# Patient Record
Sex: FEMALE | Race: WHITE | NOT HISPANIC OR LATINO
[De-identification: names, ages, dates, MRNs, and addresses within clinical notes are randomized per-mention and may not be internally consistent; named-entity substitution may affect disease eponyms.]

---

## 2018-09-02 ENCOUNTER — HEALTH MAINTENANCE LETTER (OUTPATIENT)
Age: 28
End: 2018-09-02

## 2019-02-18 ENCOUNTER — OFFICE VISIT (OUTPATIENT)
Dept: DERMATOLOGY | Facility: CLINIC | Age: 29
End: 2019-02-18
Payer: COMMERCIAL

## 2019-02-18 VITALS — SYSTOLIC BLOOD PRESSURE: 107 MMHG | RESPIRATION RATE: 16 BRPM | DIASTOLIC BLOOD PRESSURE: 72 MMHG | HEART RATE: 89 BPM

## 2019-02-18 DIAGNOSIS — L81.4 LENTIGO: ICD-10-CM

## 2019-02-18 DIAGNOSIS — D22.9 NEVUS: Primary | ICD-10-CM

## 2019-02-18 DIAGNOSIS — L82.1 SEBORRHEIC KERATOSIS: ICD-10-CM

## 2019-02-18 DIAGNOSIS — D48.5 NEOPLASM OF UNCERTAIN BEHAVIOR OF SKIN: ICD-10-CM

## 2019-02-18 DIAGNOSIS — D18.00 ANGIOMA: ICD-10-CM

## 2019-02-18 PROCEDURE — 11102 TANGNTL BX SKIN SINGLE LES: CPT | Performed by: PHYSICIAN ASSISTANT

## 2019-02-18 PROCEDURE — 99214 OFFICE O/P EST MOD 30 MIN: CPT | Mod: 25 | Performed by: PHYSICIAN ASSISTANT

## 2019-02-18 PROCEDURE — 88305 TISSUE EXAM BY PATHOLOGIST: CPT | Mod: TC | Performed by: PHYSICIAN ASSISTANT

## 2019-02-18 RX ORDER — ACETAMINOPHEN 325 MG/1
325-650 TABLET ORAL
COMMUNITY
End: 2022-04-18

## 2019-02-18 RX ORDER — IBUPROFEN 200 MG
200-600 TABLET ORAL
COMMUNITY
Start: 2019-02-05 | End: 2022-04-18

## 2019-02-18 RX ORDER — CHOLECALCIFEROL (VITAMIN D3) 50 MCG
TABLET ORAL
COMMUNITY
Start: 2018-02-17

## 2019-02-18 RX ORDER — BREAST PUMP
EACH MISCELLANEOUS
COMMUNITY
Start: 2019-02-05 | End: 2022-04-18

## 2019-02-18 RX ORDER — DOCUSATE SODIUM 100 MG/1
100 CAPSULE, LIQUID FILLED ORAL
COMMUNITY
End: 2022-04-18

## 2019-02-18 NOTE — LETTER
2/18/2019         RE: Mary Martinez  [id number] [facility] [id number]        Dear Colleague,    Thank you for referring your patient, Mary Martinez, to the [facility]. Please see a copy of my visit note below.    HPI:   Mary Martinez is a 28 year old female who presents for Full skin cancer screening.  chief complaint  Last Skin Exam: n/a      1st Baseline: YES  Personal HX of Skin Cancer: no   Personal HX of Malignant Melanoma: no   Family HX of Skin Cancer / Malignant Melanoma: no  Personal HX of Atypical Moles:   no  Risk factors: sun exposure with hx of tanning bed use for 1.5 years during high school. Using sun screen when out for long periods of time.   New / Changing lesions: irregular mole on right thigh (changing in shape). New mole on abdomen that is dark.  Social History: has 3 year old and 2 week old boys.  Going to the [facility] in april  On review of systems, there are no further skin complaints, patient is feeling otherwise well.  See patient intake sheet.  ROS of the following were done and are negative: Constitutional, Eyes, Ears, Nose,   Mouth, Throat, Cardiovascular, Respiratory, GI, Genitourinary, Musculoskeletal,   Psychiatric, Endocrine, Allergic/Immunologic.    This document serves as a record of the services and decisions personally performed and made by Neda Campbell PA-C. It was created on her behalf by Chikis Cruz, a trained medical scribe. The creation of this document is based the provider's statements to the medical scribe.  Chikis Cruz February 18, 2019 2:24 PM    PHYSICAL EXAM:   /72 (BP Location: Left arm, Patient Position: Chair, Cuff Size: Adult Regular)   Pulse 89   Resp 16   Skin exam performed as follows: Type 2 skin. Mood appropriate  Alert and Oriented X 3. Well developed, well nourished in no distress.  General appearance: Normal  Head including face: Normal  Eyes: conjunctiva and lids: Normal  Mouth: Lips, teeth, gums: Normal  Neck:  Normal  Chest-breast/axillae: Normal  Back: Normal  Spleen and liver: Normal  Cardiovascular: Exam of peripheral vascular system by observation for swelling, varicosities, edema: Normal  Genitalia: groin, buttocks: Normal  Extremities: digits/nails (clubbing): Normal  Eccrine and Apocrine glands: Normal  Right upper extremity: Normal  Left upper extremity: Normal  Right lower extremity: Normal  Left lower extremity: Normal  Skin: Scalp and body hair: See below    Pt deferred exam of breasts, groin, buttocks: No    Other physical findings:  1. Multiple pigmented macules on extremities and trunk  2. Multiple pigmented macules on face, trunk and extremities  3. Multiple vascular papules on trunk, arms and legs  4. Multiple scattered keratotic plaques  5. 2 mm black macule on mid-lower abdomen     Except as noted above, no other signs of skin cancer or melanoma.     ASSESSMENT/PLAN:   Benign Full skin cancer screening today. Patient with no history of skin cancer  Advised on monthly self exams and 1 year  Patient Education: Appropriate brochures given.    Multiple benign appearing nevi on arms, legs and trunk. Discussed ABCDEs of melanoma and sunscreen.   Multiple lentigos on arms, legs and trunk. Advised benign, no treatment needed.  Multiple scattered angiomas. Advised benign, no treatment needed.   Seborrheic keratosis on arms, legs and trunk. Advised benign, no treatment needed.  Atypical nevus on the mid lower abdomen - advised A photo was taken and placed in the chart. Shave bx in typical fashion .  Area cleaned with betadyne and anesthetized with 1% lidocaine with epi .  Dermablade used to remove the lesion and sent to pathology. Bleeding was cauterized. Pt tolerated procedure well.       Follow-up: pending path/yearly FSE/PRN sooner    1.) Patient was asked about new and changing moles. YES  2.) Patient received a complete physical skin examination: YES  3.) Patient was counseled to perform a monthly self skin  examination: YES  Scribed By: Chikis Cruz, Medical Scribe    The information in this document, created by the medical scribe for me, accurately reflects the services I personally performed and the decisions made by me. I have reviewed and approved this document for accuracy prior to leaving the patient care area.  Neda Campbell PA-C February 18, 2019 2:28 PM     Again, thank you for allowing me to participate in the care of your patient.        Sincerely,        Neda Campbell PA-C

## 2019-02-18 NOTE — PROGRESS NOTES
HPI:   Mary Martinez is a 28 year old female who presents for Full skin cancer screening.  chief complaint  Last Skin Exam: n/a      1st Baseline: YES  Personal HX of Skin Cancer: no   Personal HX of Malignant Melanoma: no   Family HX of Skin Cancer / Malignant Melanoma: no  Personal HX of Atypical Moles:   no  Risk factors: sun exposure with hx of tanning bed use for 1.5 years during high school. Using sun screen when out for long periods of time.   New / Changing lesions: irregular mole on right thigh (changing in shape). New mole on abdomen that is dark.  Social History: has 3 year old and 2 week old boys.  Going to the [employer] in april  On review of systems, there are no further skin complaints, patient is feeling otherwise well.  See patient intake sheet.  ROS of the following were done and are negative: Constitutional, Eyes, Ears, Nose,   Mouth, Throat, Cardiovascular, Respiratory, GI, Genitourinary, Musculoskeletal,   Psychiatric, Endocrine, Allergic/Immunologic.    This document serves as a record of the services and decisions personally performed and made by Neda Campbell PA-C. It was created on her behalf by Chikis Cruz, a trained medical scribe. The creation of this document is based the provider's statements to the medical scribe.  Chikis Cruz February 18, 2019 2:24 PM    PHYSICAL EXAM:   /72 (BP Location: Left arm, Patient Position: Chair, Cuff Size: Adult Regular)   Pulse 89   Resp 16   Skin exam performed as follows: Type 2 skin. Mood appropriate  Alert and Oriented X 3. Well developed, well nourished in no distress.  General appearance: Normal  Head including face: Normal  Eyes: conjunctiva and lids: Normal  Mouth: Lips, teeth, gums: Normal  Neck: Normal  Chest-breast/axillae: Normal  Back: Normal  Spleen and liver: Normal  Cardiovascular: Exam of peripheral vascular system by observation for swelling, varicosities, edema: Normal  Genitalia: groin, buttocks: Normal  Extremities:  digits/nails (clubbing): Normal  Eccrine and Apocrine glands: Normal  Right upper extremity: Normal  Left upper extremity: Normal  Right lower extremity: Normal  Left lower extremity: Normal  Skin: Scalp and body hair: See below    Pt deferred exam of breasts, groin, buttocks: No    Other physical findings:  1. Multiple pigmented macules on extremities and trunk  2. Multiple pigmented macules on face, trunk and extremities  3. Multiple vascular papules on trunk, arms and legs  4. Multiple scattered keratotic plaques  5. 2 mm black macule on mid-lower abdomen     Except as noted above, no other signs of skin cancer or melanoma.     ASSESSMENT/PLAN:   Benign Full skin cancer screening today. Patient with no history of skin cancer  Advised on monthly self exams and 1 year  Patient Education: Appropriate brochures given.    Multiple benign appearing nevi on arms, legs and trunk. Discussed ABCDEs of melanoma and sunscreen.   Multiple lentigos on arms, legs and trunk. Advised benign, no treatment needed.  Multiple scattered angiomas. Advised benign, no treatment needed.   Seborrheic keratosis on arms, legs and trunk. Advised benign, no treatment needed.  Atypical nevus on the mid lower abdomen - advised A photo was taken and placed in the chart. Shave bx in typical fashion .  Area cleaned with betadyne and anesthetized with 1% lidocaine with epi .  Dermablade used to remove the lesion and sent to pathology. Bleeding was cauterized. Pt tolerated procedure well.       Follow-up: pending path/yearly FSE/PRN sooner    1.) Patient was asked about new and changing moles. YES  2.) Patient received a complete physical skin examination: YES  3.) Patient was counseled to perform a monthly self skin examination: YES  Scribed By: Chikis Cruz Medical Scribe    The information in this document, created by the medical scribe for me, accurately reflects the services I personally performed and the decisions made by me. I have reviewed  and approved this document for accuracy prior to leaving the patient care area.  Neda Campbell PA-C February 18, 2019 2:28 PM

## 2019-02-18 NOTE — NURSING NOTE
"Chief Complaint   Patient presents with     Derm Problem     irregular mole on leg       Initial /72 (BP Location: Left arm, Patient Position: Chair, Cuff Size: Adult Regular)   Pulse 89   Resp 16  Estimated body mass index is 22.22 kg/m  as calculated from the following:    Height as of 3/24/16: 1.746 m (5' 8.75\").    Weight as of 3/24/16: 67.8 kg (149 lb 6.4 oz).  Medications and allergies reviewed.    Gillian BANUELOS CMA    "

## 2019-02-18 NOTE — PROGRESS NOTES
HPI:   Mary Bond is a 28 year old female who presents for evaluation of mole on her leg.  chief complaint  Location: ***   Condition present for:  ***.   Previous treatments include: ***    Review Of Systems  Eyes: negative  Ears/Nose/Throat: negative  Respiratory: No shortness of breath, dyspnea on exertion, cough, or hemoptysis  Cardiovascular: negative  Gastrointestinal: negative  Genitourinary: negative  Musculoskeletal: negative  Neurologic: negative  Psychiatric: negative    This document serves as a record of the services and decisions personally performed and made by Neda Campbell PA-C. It was created on her behalf by Chikis Cruz, a trained medical scribe. The creation of this document is based the provider's statements to the medical scribe.  Chikis Cruz February 18, 2019 2:15 PM    PHYSICAL EXAM:    There were no vitals taken for this visit.  Skin exam performed as follows: Type 2 skin. Mood appropriate  Alert and Oriented X 3. Well developed, well nourished in no distress.  General appearance: Normal  Head including face: Normal  Eyes: conjunctiva and lids: Normal  Mouth: Lips, teeth, gums: Normal  Neck: Normal  Chest-breast/axillae: Normal  Back: Normal  Spleen and liver: Normal  Cardiovascular: Exam of peripheral vascular system by observation for swelling, varicosities, edema: Normal  Genitalia: groin, buttocks: Normal  Extremities: digits/nails (clubbing): Normal  Eccrine and Apocrine glands: Normal  Right upper extremity: Normal  Left upper extremity: Normal  Right lower extremity: Normal  Left lower extremity: Normal  Skin: Scalp and body hair: See below    1. ***     ASSESSMENT/PLAN:     1. ***        Follow-up: ***  CC:   Scribed By: Chikis Cruz Medical Scribe    ***

## 2019-02-18 NOTE — PATIENT INSTRUCTIONS
Wound Care Instructions     FOR SUPERFICIAL WOUNDS     [facility] [phone]    [facility] [phone]                       AFTER 24 HOURS YOU SHOULD REMOVE THE BANDAGE AND BEGIN DAILY DRESSING CHANGES AS FOLLOWS:     1) Remove Dressing.     2) Clean and dry the area with tap water using a Q-tip or sterile gauze pad.     3) Apply Vaseline, Aquaphor, Polysporin ointment or Bacitracin ointment over entire wound.  Do NOT use Neosporin ointment.     4) Cover the wound with a band-aid, or a sterile non-stick gauze pad and micropore paper tape      REPEAT THESE INSTRUCTIONS AT LEAST ONCE A DAY UNTIL THE WOUND HAS COMPLETELY HEALED.    It is an old wives tale that a wound heals better when it is exposed to air and allowed to dry out. The wound will heal faster with a better cosmetic result if it is kept moist with ointment and covered with a bandage.    **Do not let the wound dry out.**      Supplies Needed:      *Cotton tipped applicators (Q-tips)    *Polysporin Ointment or Bacitracin Ointment (NOT NEOSPORIN)    *Band-aids or non-stick gauze pads and micropore paper tape.      PATIENT INFORMATION:    During the healing process you will notice a number of changes. All wounds develop a small halo of redness surrounding the wound.  This means healing is occurring. Severe itching with extensive redness usually indicates sensitivity to the ointment or bandage tape used to dress the wound.  You should call our office if this develops.      Swelling  and/or discoloration around your surgical site is common, particularly when performed around the eye.    All wounds normally drain.  The larger the wound the more drainage there will be.  After 7-10 days, you will notice the wound beginning to shrink and new skin will begin to grow.  The wound is healed when you can see skin has formed over the entire area.  A healed wound has a healthy, shiny look to the surface and is red to dark pink in color  to normalize.  Wounds may take approximately 4-6 weeks to heal.  Larger wounds may take 6-8 weeks.  After the wound is healed you may discontinue dressing changes.    You may experience a sensation of tightness as your wound heals. This is normal and will gradually subside.    Your healed wound may be sensitive to temperature changes. This sensitivity improves with time, but if you re having a lot of discomfort, try to avoid temperature extremes.    Patients frequently experience itching after their wound appears to have healed because of the continue healing under the skin.  Plain Vaseline will help relieve the itching.        POSSIBLE COMPLICATIONS    BLEEDIN. Leave the bandage in place.  2. Use tightly rolled up gauze or a cloth to apply direct pressure over the bandage for 30  minutes.  3. Reapply pressure for an additional 30 minutes if necessary  4. Use additional gauze and tape to maintain pressure once the bleeding has stopped.

## 2019-02-20 LAB — COPATH REPORT: NORMAL

## 2019-02-26 ENCOUNTER — OFFICE VISIT (OUTPATIENT)
Dept: LAB | Facility: SCHOOL | Age: 29
End: 2019-02-26
Payer: COMMERCIAL

## 2019-02-26 VITALS
WEIGHT: 161 LBS | SYSTOLIC BLOOD PRESSURE: 110 MMHG | BODY MASS INDEX: 23.85 KG/M2 | RESPIRATION RATE: 12 BRPM | HEIGHT: 69 IN | TEMPERATURE: 97.7 F | DIASTOLIC BLOOD PRESSURE: 68 MMHG | HEART RATE: 91 BPM | OXYGEN SATURATION: 99 %

## 2019-02-26 DIAGNOSIS — Z00.00 WELLNESS EXAMINATION: Primary | ICD-10-CM

## 2019-02-26 PROCEDURE — 99213 OFFICE O/P EST LOW 20 MIN: CPT

## 2019-02-26 RX ORDER — PRENATAL VIT/IRON FUM/FOLIC AC 27MG-0.8MG
1 TABLET ORAL DAILY
Refills: 3 | COMMUNITY
Start: 2018-10-05 | End: 2022-04-18

## 2019-02-26 ASSESSMENT — MIFFLIN-ST. JEOR: SCORE: 1523.05

## 2019-02-26 NOTE — PROGRESS NOTES
"SUBJECTIVE:  CC: Mary Martinez is an 28 year old woman who presents for Wellness Check at [facility] [id number].     Review of Healthy Lifestyle:    Do you get at least three servings of calcium containing foods daily (dairy, green leafy vegetables, etc.)? yes     Do you have a high-fiber diet? yes     Amount of exercise or daily activities, outside of work: not now    Do you wear sunscreen on a regular basis? Yes      Are you taking your medications regularly Yes     Have you had an eye exam in the past two years? yes    Do you see a dentist twice per year? no    Do you have sleep apnea, excessive snoring or excessive daytime drowsiness? no    Do you use tobacco in any form? no     OBJECTIVE:    Vitals: /68   Pulse 91   Temp 97.7  F (36.5  C) (Tympanic)   Resp 12   Ht 1.75 m (5' 8.9\")   Wt 73 kg (161 lb)   SpO2 99%   Breastfeeding? Yes   BMI 23.85 kg/m    BMI= Body mass index is 23.85 kg/m .    HEARING: Right Ear:        500Hz:  RESPONSE- on Level:   20 db    1000 Hz: RESPONSE- on Level:   20 db    2000 Hz: RESPONSE- on Level:   20 db    4000 Hz: RESPONSE- on Level:   20 db     Left Ear:       500Hz:  RESPONSE- on Level:   20 db    1000 Hz: RESPONSE- on Level:   20 db    2000 Hz: RESPONSE- on Level:   20 db    4000 Hz: RESPONSE- on Level:   20 db     VISION:  Right eye:  20/20  Left eye:     20/20  Both eyes: 20/20  Corrective lenses?  Yes    Medication Reconciliation: complete    ASSESSMENT/PLAN:  Patient is here today for wellness examination.  Patient was given information on recommendations for health, preventative care, diet and lifestyle changes for her health.  No referrals needed today.    COUNSELING:      reports that  has never smoked. she has never used smokeless tobacco.    Estimated body mass index is 23.85 kg/m  as calculated from the following:    Height as of this encounter: 1.75 m (5' 8.9\").    Weight as of this encounter: 73 kg (161 lb).     Alison WISDOM" DACIA Casillas on 2/26/2019 at 2:24 PM  [facility]

## 2019-02-26 NOTE — PATIENT INSTRUCTIONS
"                 Mary Martinez has completed a Wellness Check at the [facility] on 2/26/2019.      ____________________________________________  [employer] PROVID[employer]                                                                               Wellness Visit Recommendations:      See your regular primary care health provider every year in order to help stay healthy.    Review health changes.     Review your medicines if your doctor has prescribed any.    Talk to your provider about how often to have your cholesterol checked.    If you are at risk for diabetes, you should have a diabetes test (fasting glucose).    Shots: Get a flu shot each year. Get a tetanus shot every 10 years.     Review with your primary care provider other immunizations that you may need based on your age and/or medical/surgical history    Nutrition:     Eat at least 5 servings of fruits and vegetables each day.    Eat whole-grain bread, whole-wheat pasta and brown rice instead of white grains and rice.    Preventive Care to be reviewed by your primary care provider:    Females:        Cervical Cancer Screening                          Breast Cancer Screening                          Colon Cancer Screening  Males:             Prostrate Cancer Screening                          Colon Cancer Screening      Lifestyle:    Exercise at least 150 minutes a week (30 minutes a day, 5 days of the week). This will help you control your weight and help prevent disease or manage disease.    Limit alcohol to one drink per day or less depending on your past medical history.    No smoking.     Wear sunscreen to prevent skin cancer.    See your dentist every six months for an exam and cleaning.    Today's Vital Signs:  /68   Pulse 91   Temp 97.7  F (36.5  C) (Tympanic)   Resp 12   Ht 1.75 m (5' 8.9\")   Wt 73 kg (161 lb)   SpO2 99%   Breastfeeding? Yes   BMI 23.85 kg/m    "

## 2019-11-03 ENCOUNTER — HEALTH MAINTENANCE LETTER (OUTPATIENT)
Age: 29
End: 2019-11-03

## 2020-06-18 ENCOUNTER — OFFICE VISIT (OUTPATIENT)
Dept: DERMATOLOGY | Facility: CLINIC | Age: 30
End: 2020-06-18
Payer: COMMERCIAL

## 2020-06-18 VITALS — SYSTOLIC BLOOD PRESSURE: 109 MMHG | DIASTOLIC BLOOD PRESSURE: 70 MMHG | HEART RATE: 80 BPM | OXYGEN SATURATION: 98 %

## 2020-06-18 DIAGNOSIS — D22.9 NEVUS: Primary | ICD-10-CM

## 2020-06-18 DIAGNOSIS — L81.4 LENTIGO: ICD-10-CM

## 2020-06-18 DIAGNOSIS — D18.01 ANGIOMA OF SKIN: ICD-10-CM

## 2020-06-18 DIAGNOSIS — L82.1 SEBORRHEIC KERATOSIS: ICD-10-CM

## 2020-06-18 PROCEDURE — 99213 OFFICE O/P EST LOW 20 MIN: CPT | Performed by: PHYSICIAN ASSISTANT

## 2020-06-18 NOTE — PROGRESS NOTES
HPI:   CC: Mary Martinez is a 30 year old female who presents for Full skin cancer screening to rule out skin cancer.   Last Skin Exam: 1 year ago    1st Baseline: 2019  Personal HX of Skin Cancer: no   Personal HX of Malignant Melanoma: no   Family HX of Skin Cancer / Malignant Melanoma: no  Personal HX of Atypical Moles: yes mildly atypical nevus removed  Risk factors: sun exposure with hx of tanning bed use for 1.5 years during high school. Using sun screen when out for long periods of time.   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   On review of systems, there are no further skin complaints, patient is feeling otherwise well.  See patient intake sheet.  ROS of the following were done and are negative: Constitutional, Eyes, Ears, Nose,   Mouth, Throat, Cardiovascular, Respiratory, GI, Genitourinary, Musculoskeletal,   Psychiatric, Endocrine, Allergic/Immunologic.    HPI, past medical history, social history, allergies, medications reviewed as of June 18, 2020    PHYSICAL EXAM:   /70   Pulse 80   SpO2 98%   Skin exam performed as follows: Type 2 skin. Mood appropriate  Alert and Oriented X 3. Well developed, well nourished in no distress.  General appearance: Normal  Head including face: Normal  Eyes: conjunctiva and lids: Normal  Mouth: Lips, teeth, gums: Normal  Neck: Normal  Chest-breast/axillae: Normal  Back: Normal  Spleen and liver: Normal  Cardiovascular: Exam of peripheral vascular system by observation for swelling, varicosities, edema: Normal  Genitalia: groin, buttocks: Normal  Extremities: digits/nails (clubbing): Normal  Eccrine and Apocrine glands: Normal  Right upper extremity: Normal  Left upper extremity: Normal  Right lower extremity: Normal  Left lower extremity: Normal  Skin: Scalp and body hair: See below    Pt deferred exam of breasts, groin, buttocks: No    Other physical  findings:  1. Multiple pigmented macules on extremities and trunk  2. Multiple pigmented macules on face, trunk and extremities  3. Multiple vascular papules on trunk, arms and legs  4. Multiple scattered keratotic plaques       Except as noted above, no other signs of skin cancer or melanoma.     ASSESSMENT/PLAN:   Benign Full skin cancer screening today. Patient with no history of skin cancer  Advised on monthly self exams and 1 year  Patient Education: Appropriate brochures given.    Multiple benign appearing nevi on arms, legs and trunk. Discussed ABCDEs of melanoma and sunscreen.   Multiple lentigos on arms, legs and trunk. Advised benign, no treatment needed.  Multiple scattered angiomas. Advised benign, no treatment needed.   Seborrheic keratosis on arms, legs and trunk. Advised benign, no treatment needed    Follow-up: yearly FSE/PRN sooner    1.) Patient was asked about new and changing moles. YES  2.) Patient received a complete physical skin examination: YES  3.) Patient was counseled to perform a monthly self skin examination: YES  Scribed By: Neda Campbell MS, PACassandraC

## 2020-06-18 NOTE — LETTER
6/18/2020         RE: Mary Martinez  [id number] [facility] [id number]        Dear Colleague,    Thank you for referring your patient, Mary Martinez, to the [facility]. Please see a copy of my visit note below.    HPI:   CC: Mary Martinez is a 30 year old female who presents for Full skin cancer screening to rule out skin cancer.   Last Skin Exam: 1 year ago    1st Baseline: 2019  Personal HX of Skin Cancer: no   Personal HX of Malignant Melanoma: no   Family HX of Skin Cancer / Malignant Melanoma: no  Personal HX of Atypical Moles: yes mildly atypical nevus removed  Risk factors: sun exposure with hx of tanning bed use for 1.5 years during high school. Using sun screen when out for long periods of time.   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   On review of systems, there are no further skin complaints, patient is feeling otherwise well.  See patient intake sheet.  ROS of the following were done and are negative: Constitutional, Eyes, Ears, Nose,   Mouth, Throat, Cardiovascular, Respiratory, GI, Genitourinary, Musculoskeletal,   Psychiatric, Endocrine, Allergic/Immunologic.    HPI, past medical history, social history, allergies, medications reviewed as of June 18, 2020    PHYSICAL EXAM:   /70   Pulse 80   SpO2 98%   Skin exam performed as follows: Type 2 skin. Mood appropriate  Alert and Oriented X 3. Well developed, well nourished in no distress.  General appearance: Normal  Head including face: Normal  Eyes: conjunctiva and lids: Normal  Mouth: Lips, teeth, gums: Normal  Neck: Normal  Chest-breast/axillae: Normal  Back: Normal  Spleen and liver: Normal  Cardiovascular: Exam of peripheral vascular system by observation for swelling, varicosities, edema: Normal  Genitalia: groin, buttocks: Normal  Extremities: digits/nails (clubbing): Normal  Eccrine and Apocrine  glands: Normal  Right upper extremity: Normal  Left upper extremity: Normal  Right lower extremity: Normal  Left lower extremity: Normal  Skin: Scalp and body hair: See below    Pt deferred exam of breasts, groin, buttocks: No    Other physical findings:  1. Multiple pigmented macules on extremities and trunk  2. Multiple pigmented macules on face, trunk and extremities  3. Multiple vascular papules on trunk, arms and legs  4. Multiple scattered keratotic plaques       Except as noted above, no other signs of skin cancer or melanoma.     ASSESSMENT/PLAN:   Benign Full skin cancer screening today. Patient with no history of skin cancer  Advised on monthly self exams and 1 year  Patient Education: Appropriate brochures given.    Multiple benign appearing nevi on arms, legs and trunk. Discussed ABCDEs of melanoma and sunscreen.   Multiple lentigos on arms, legs and trunk. Advised benign, no treatment needed.  Multiple scattered angiomas. Advised benign, no treatment needed.   Seborrheic keratosis on arms, legs and trunk. Advised benign, no treatment needed    Follow-up: yearly FSE/PRN sooner    1.) Patient was asked about new and changing moles. YES  2.) Patient received a complete physical skin examination: YES  3.) Patient was counseled to perform a monthly self skin examination: YES  Scribed By: Neda Campbell, MS, PACassandraC      Again, thank you for allowing me to participate in the care of your patient.        Sincerely,        Neda Campbell PA-C

## 2020-09-18 ENCOUNTER — OFFICE VISIT (OUTPATIENT)
Dept: LAB | Facility: SCHOOL | Age: 30
End: 2020-09-18
Payer: COMMERCIAL

## 2020-09-18 VITALS
WEIGHT: 147 LBS | DIASTOLIC BLOOD PRESSURE: 76 MMHG | SYSTOLIC BLOOD PRESSURE: 108 MMHG | BODY MASS INDEX: 21.77 KG/M2 | HEIGHT: 69 IN | TEMPERATURE: 98 F | OXYGEN SATURATION: 98 % | HEART RATE: 80 BPM

## 2020-09-18 DIAGNOSIS — Z00.00 WELLNESS EXAMINATION: Primary | ICD-10-CM

## 2020-09-18 DIAGNOSIS — Z23 NEED FOR PROPHYLACTIC VACCINATION AND INOCULATION AGAINST INFLUENZA: ICD-10-CM

## 2020-09-18 PROCEDURE — 99213 OFFICE O/P EST LOW 20 MIN: CPT | Mod: 25

## 2020-09-18 PROCEDURE — 90686 IIV4 VACC NO PRSV 0.5 ML IM: CPT

## 2020-09-18 PROCEDURE — 90471 IMMUNIZATION ADMIN: CPT

## 2020-09-18 ASSESSMENT — MIFFLIN-ST. JEOR: SCORE: 1451.17

## 2020-09-18 NOTE — PATIENT INSTRUCTIONS
"                Mary Martinez has completed a Wellness Check at the [facility] on 9/18/2020.      ____________________________________________  [place] SCHOOL PROVIDER                                                                               Wellness Visit Recommendations:      See your regular primary care health provider every year in order to help stay healthy.    Review health changes.     Review your medicines if your doctor has prescribed any.    Talk to your provider about how often to have your cholesterol checked.    If you are at risk for diabetes, you should have a diabetes test (fasting glucose).    Shots: Get a flu shot each year. Get a tetanus shot every 10 years.     Review with your primary care provider other immunizations that you may need based on your age and/or medical/surgical history    Nutrition:     Eat at least 5 servings of fruits and vegetables each day.    Eat whole-grain bread, whole-wheat pasta and brown rice instead of white grains and rice.    Preventive Care to be reviewed by your primary care provider:    Females:        Cervical Cancer Screening                          Breast Cancer Screening                          Colon Cancer Screening  Males:             Prostrate Cancer Screening                          Colon Cancer Screening      Lifestyle:    Exercise at least 150 minutes a week (30 minutes a day, 5 days of the week). This will help you control your weight and help prevent disease or manage disease.    Limit alcohol to one drink per day or less depending on your past medical history.    No smoking.     Wear sunscreen to prevent skin cancer.    See your dentist every six months for an exam and cleaning.    Today's Vital Signs:  /76 (BP Location: Right arm, Patient Position: Sitting, Cuff Size: Adult Regular)   Pulse 80   Temp 98  F (36.7  C) (Tympanic)   Ht 1.753 m (5' 9\")   Wt 66.7 kg (147 lb)   SpO2 98%   BMI 21.71 kg/m    "

## 2020-09-18 NOTE — PROGRESS NOTES
"SUBJECTIVE:  CC: Mary Martinez is an 30 year old woman who presents for Wellness Check at [id number].     Review of Healthy Lifestyle:    Do you get at least three servings of calcium containing foods daily (dairy, green leafy vegetables, etc.)? yes     Do you have a high-fiber diet? yes     Amount of exercise or daily activities, outside of work: has young kids and lots of walking    Do you wear sunscreen on a regular basis? No     Are you taking your medications regularly not applicable    Have you had an eye exam in the past two years? no    Do you see a dentist twice per year? yes    Do you have sleep apnea, excessive snoring or excessive daytime drowsiness? no    Do you use tobacco in any form? no       OBJECTIVE:    Vitals: /76 (BP Location: Right arm, Patient Position: Sitting, Cuff Size: Adult Regular)   Pulse 80   Temp 98  F (36.7  C) (Tympanic)   Ht 1.753 m (5' 9\")   Wt 66.7 kg (147 lb)   SpO2 98%   BMI 21.71 kg/m    BMI= Body mass index is 21.71 kg/m .    HEARING: :  Testing not done; parent declined    VISION:  Right eye:  decline  Left eye:     decline  Both eyes: decline  Corrective lenses?  Yes    Medication Reconciliation: complete    ASSESSMENT/PLAN:  (Z00.00) Wellness examination  (primary encounter diagnosis)  Comment: Doing well, not fasting for labs today. Flu shot given.  Plan:       COUNSELING:      reports that she has never smoked. She has never used smokeless tobacco.    Estimated body mass index is 21.71 kg/m  as calculated from the following:    Height as of this encounter: 1.753 m (5' 9\").    Weight as of this encounter: 66.7 kg (147 lb).       Counseling Resources  [employer]  [email or web address]  Maria Elena Dover, [facility] SCHOOL PROVIDER  [facility]     "

## 2020-11-16 ENCOUNTER — HEALTH MAINTENANCE LETTER (OUTPATIENT)
Age: 30
End: 2020-11-16

## 2021-04-13 ENCOUNTER — TRANSFERRED RECORDS (OUTPATIENT)
Dept: MULTI SPECIALTY CLINIC | Facility: CLINIC | Age: 31
End: 2021-04-13

## 2021-04-13 LAB — HPV ABSTRACT: NORMAL

## 2021-09-18 ENCOUNTER — HEALTH MAINTENANCE LETTER (OUTPATIENT)
Age: 31
End: 2021-09-18

## 2022-01-02 ENCOUNTER — HEALTH MAINTENANCE LETTER (OUTPATIENT)
Age: 32
End: 2022-01-02

## 2022-04-18 ENCOUNTER — OFFICE VISIT (OUTPATIENT)
Dept: LAB | Facility: SCHOOL | Age: 32
End: 2022-04-18
Payer: COMMERCIAL

## 2022-04-18 VITALS
DIASTOLIC BLOOD PRESSURE: 56 MMHG | HEART RATE: 77 BPM | OXYGEN SATURATION: 100 % | HEIGHT: 69 IN | SYSTOLIC BLOOD PRESSURE: 94 MMHG | WEIGHT: 150 LBS | RESPIRATION RATE: 16 BRPM | TEMPERATURE: 98.1 F | BODY MASS INDEX: 22.22 KG/M2

## 2022-04-18 DIAGNOSIS — Z00.00 WELLNESS EXAMINATION: ICD-10-CM

## 2022-04-18 DIAGNOSIS — Z00.8 ENCOUNTER FOR BIOMETRIC SCREENING: Primary | ICD-10-CM

## 2022-04-18 PROBLEM — Z97.5 IUD (INTRAUTERINE DEVICE) IN PLACE: Status: ACTIVE | Noted: 2019-03-26

## 2022-04-18 LAB
CHOLEST SERPL-MCNC: 160 MG/DL
FASTING STATUS PATIENT QL REPORTED: YES
GLUCOSE BLD-MCNC: 86 MG/DL (ref 60–99)
HDLC SERPL-MCNC: 60 MG/DL
LDLC SERPL CALC-MCNC: 89 MG/DL
NONHDLC SERPL-MCNC: 100 MG/DL
TRIGL SERPL-MCNC: 57 MG/DL

## 2022-04-18 PROCEDURE — 36415 COLL VENOUS BLD VENIPUNCTURE: CPT

## 2022-04-18 PROCEDURE — 82947 ASSAY GLUCOSE BLOOD QUANT: CPT

## 2022-04-18 PROCEDURE — 99213 OFFICE O/P EST LOW 20 MIN: CPT

## 2022-04-18 PROCEDURE — 80061 LIPID PANEL: CPT | Performed by: NURSE PRACTITIONER

## 2022-04-18 RX ORDER — CITALOPRAM HYDROBROMIDE 20 MG/1
TABLET ORAL
COMMUNITY
Start: 2022-02-21 | End: 2024-06-17

## 2022-04-18 ASSESSMENT — PAIN SCALES - GENERAL: PAINLEVEL: NO PAIN (0)

## 2022-04-18 NOTE — PROGRESS NOTES
"  SUBJECTIVE:  CC: Mary is a 31 year old who presents for Wellness Check at [facility].     Review of Healthy Lifestyle:    Do you get at least three servings of calcium containing foods daily (dairy, green leafy vegetables, etc.)? yes     Do you have a high-fiber diet? yes     Amount of exercise or daily activities, outside of work: 4 day(s) per week    Do you wear sunscreen on a regular basis? Yes      Are you taking your medications regularly Yes     Have you had an eye exam in the past two years? yes    Do you see a dentist twice per year? yes    Do you have sleep apnea, excessive snoring or excessive daytime drowsiness? no    Do you use tobacco in any form? no       OBJECTIVE:    Vitals: BP 94/56 (BP Location: Right arm, Patient Position: Sitting, Cuff Size: Adult Regular)   Pulse 77   Temp 98.1  F (36.7  C) (Tympanic)   Resp 16   Ht 1.753 m (5' 9\")   Wt 68 kg (150 lb)   SpO2 100%   BMI 22.15 kg/m    BMI= Body mass index is 22.15 kg/m .    HEARING: has no concerns, declines       VISION: Goes to [facility] every other year    Medication Reconciliation: complete    ASSESSMENT/PLAN:  Z00.8) Encounter for biometric screening  (primary encounter diagnosis)  Comment: Fasting lipid and glucose sent.  Plan: Glucose whole blood, Lipid Profile           (Z00.00) Wellness examination  Comment:  Patient is here today for wellness examination.  Patient was given information on recommendations for health, preventative care, diet and lifestyle changes for good health.  No referrals needed today.    COUNSELING:      reports that she has never smoked. She has never used smokeless tobacco.    Estimated body mass index is 22.15 kg/m  as calculated from the following:    Height as of this encounter: 1.753 m (5' 9\").    Weight as of this encounter: 68 kg (150 lb).     Alison Casillas NP on 4/18/2022 at 8:53 AM  [facility]     "

## 2022-04-18 NOTE — PATIENT INSTRUCTIONS
Mary Lopezalena has completed a Wellness Check at the [facility] on 4/18/2022.      ____________________________________________  [place] SCHOOL PROVIDER                                                                               Wellness Visit Recommendations:      See your regular primary care health provider every year in order to help stay healthy.    Review health changes.     Review your medicines if your doctor has prescribed any.    Talk to your provider about how often to have your cholesterol checked.    If you are at risk for diabetes, you should have a diabetes test (fasting glucose).    Shots: Get a flu shot each year. Get a tetanus shot every 10 years.     Review with your primary care provider other immunizations that you may need based on your age and/or medical/surgical history    Nutrition:     Eat at least 5 servings of fruits and vegetables each day.    Eat whole-grain bread, whole-wheat pasta and brown rice instead of white grains and rice.    Preventive Care to be reviewed by your primary care provider:    Females:        Cervical Cancer Screening                          Breast Cancer Screening                          Colon Cancer Screening  Males:             Prostrate Cancer Screening                          Colon Cancer Screening      Lifestyle:    Exercise at least 150 minutes a week (30 minutes a day, 5 days of the week). This will help you control your weight and help prevent disease or manage disease.    Limit alcohol to one drink per day or less depending on your past medical history.    No smoking.     Wear sunscreen to prevent skin cancer.    See your dentist every six months for an exam and cleaning.    Today's Vital Signs:  There were no vitals taken for this visit.

## 2022-07-18 ENCOUNTER — OFFICE VISIT (OUTPATIENT)
Dept: DERMATOLOGY | Facility: CLINIC | Age: 32
End: 2022-07-18
Payer: COMMERCIAL

## 2022-07-18 DIAGNOSIS — L81.4 LENTIGO: ICD-10-CM

## 2022-07-18 DIAGNOSIS — D48.5 NEOPLASM OF UNCERTAIN BEHAVIOR OF SKIN: ICD-10-CM

## 2022-07-18 DIAGNOSIS — D22.9 NEVUS: Primary | ICD-10-CM

## 2022-07-18 DIAGNOSIS — D18.01 ANGIOMA OF SKIN: ICD-10-CM

## 2022-07-18 PROCEDURE — 11102 TANGNTL BX SKIN SINGLE LES: CPT | Performed by: PHYSICIAN ASSISTANT

## 2022-07-18 PROCEDURE — 88341 IMHCHEM/IMCYTCHM EA ADD ANTB: CPT | Performed by: DERMATOLOGY

## 2022-07-18 PROCEDURE — 88305 TISSUE EXAM BY PATHOLOGIST: CPT | Performed by: DERMATOLOGY

## 2022-07-18 PROCEDURE — 88342 IMHCHEM/IMCYTCHM 1ST ANTB: CPT | Performed by: DERMATOLOGY

## 2022-07-18 PROCEDURE — 99213 OFFICE O/P EST LOW 20 MIN: CPT | Mod: 25 | Performed by: PHYSICIAN ASSISTANT

## 2022-07-18 ASSESSMENT — PAIN SCALES - GENERAL: PAINLEVEL: NO PAIN (0)

## 2022-07-18 NOTE — PROGRESS NOTES
HPI:   CC: Mary Martinez is a 32 year old female who presents for Full skin cancer screening to rule out skin cancer.   Last Skin Exam: 2 years ago    1st Baseline: No  Personal HX of Skin Cancer: no   Personal HX of Malignant Melanoma: no   Family HX of Skin Cancer / Malignant Melanoma: no  Personal HX of Atypical Moles: yes mildly atypical nevus removed  Risk factors: sun exposure with hx of tanning bed use for 1.5 years during high school.    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   On review of systems, there are no further skin complaints, patient is feeling otherwise well.  See patient intake sheet.  ROS of the following were done and are negative: Constitutional, Eyes, Ears, Nose,   Mouth, Throat, Cardiovascular, Respiratory, GI, Genitourinary, Musculoskeletal,   Psychiatric, Endocrine, Allergic/Immunologic.      PHYSICAL EXAM:   There were no vitals taken for this visit.  Skin exam performed as follows: Type 2 skin. Mood appropriate  Alert and Oriented X 3. Well developed, well nourished in no distress.  General appearance: Normal  Head including face: Normal  Eyes: conjunctiva and lids: Normal  Mouth: Lips, teeth, gums: Normal  Neck: Normal  Chest-breast/axillae: Normal  Back: Normal  Spleen and liver: Normal  Cardiovascular: Exam of peripheral vascular system by observation for swelling, varicosities, edema: Normal  Genitalia: groin, buttocks: Normal  Extremities: digits/nails (clubbing): Normal  Eccrine and Apocrine glands: Normal  Right upper extremity: Normal  Left upper extremity: Normal  Right lower extremity: Normal  Left lower extremity: Normal  Skin: Scalp and body hair: See below    Pt deferred exam of breasts, groin, buttocks: No    Other physical findings:  1. Multiple pigmented macules on extremities and trunk  2. Multiple pigmented macules on  face, trunk and extremities  3. Multiple vascular papules on trunk, arms and legs  4. 3 mm dark brown macule on the left lower calf       Except as noted above, no other signs of skin cancer or melanoma.     ASSESSMENT/PLAN:   Benign Full skin cancer screening today. Patient with no history of skin cancer  Advised on monthly self exams and 1 year  Patient Education: Appropriate brochures given.    Multiple benign appearing nevi on arms, legs and trunk. Discussed ABCDEs of melanoma and sunscreen.   Multiple lentigos on arms, legs and trunk. Advised benign, no treatment needed.  Multiple scattered angiomas. Advised benign, no treatment needed.   Atypical nevus on the left lower calf. Shave biopsy performed.  Area cleaned and anesthetized with 1% lidocaine with epinephrine.  Dermablade used to remove the lesion and sent to pathology. Bleeding was cauterized. Pt tolerated procedure well with no complications.       Follow-up: yearly FSE/PRN sooner    1.) Patient was asked about new and changing moles. YES  2.) Patient received a complete physical skin examination: YES  3.) Patient was counseled to perform a monthly self skin examination: YES  Scribed By: Neda Campbell, MS, PACassandraC

## 2022-07-18 NOTE — PATIENT INSTRUCTIONS
Wound Care Instructions     FOR SUPERFICIAL WOUNDS     [facility]  [phone]                 AFTER 24 HOURS YOU SHOULD REMOVE THE BANDAGE AND BEGIN DAILY DRESSING CHANGES AS FOLLOWS:     1) Remove Dressing.     2) Clean and dry the area with tap water using a Q-tip or sterile gauze pad.     3) Apply Vaseline, Aquaphor, Polysporin ointment or Bacitracin ointment over entire wound.  Do NOT use Neosporin ointment.     4) Cover the wound with a band-aid, or a sterile non-stick gauze pad and micropore paper tape    REPEAT THESE INSTRUCTIONS AT LEAST ONCE A DAY UNTIL THE WOUND HAS COMPLETELY HEALED.    It is an old wives tale that a wound heals better when it is exposed to air and allowed to dry out. The wound will heal faster with a better cosmetic result if it is kept moist with ointment and covered with a bandage.    **Do not let the wound dry out.**    Supplies Needed:      *Cotton tipped applicators (Q-tips)    *Vaseline, Aquaphor, Polysporin or Bacitracin Ointment (NOT NEOSPORIN)    *Band-aids or non-stick gauze pads and micropore paper tape.      PATIENT INFORMATION:    During the healing process you will notice a number of changes. All wounds develop a small halo of redness surrounding the wound.  This means healing is occurring. Severe itching with extensive redness usually indicates sensitivity to the ointment or bandage tape used to dress the wound.  You should call our office if this develops.      Swelling  and/or discoloration around your surgical site is common, particularly when performed around the eye.    All wounds normally drain.  The larger the wound the more drainage there will be.  After 7-10 days, you will notice the wound beginning to shrink and new skin will begin to grow.  The wound is healed when you can see skin has formed over the entire area.  A healed wound has a healthy, shiny look to the surface and is red to dark pink in color to normalize.  Wounds may  take approximately 4-6 weeks to heal.  Larger wounds may take 6-8 weeks.  After the wound is healed you may discontinue dressing changes.    You may experience a sensation of tightness as your wound heals. This is normal and will gradually subside.    Your healed wound may be sensitive to temperature changes. This sensitivity improves with time, but if you re having a lot of discomfort, try to avoid temperature extremes.    Patients frequently experience itching after their wound appears to have healed because of the continue healing under the skin.  Plain Vaseline will help relieve the itching.      POSSIBLE COMPLICATIONS    BLEEDING:    Leave the bandage in place.  Use tightly rolled up gauze or a cloth to apply direct pressure over the bandage for 30  minutes.  Reapply pressure for an additional 30 minutes if necessary  Use additional gauze and tape to maintain pressure once the bleeding has stopped.

## 2022-07-18 NOTE — LETTER
7/18/2022         RE: Mary Martinez  [id number]  [place] [facility] [address]        Dear Colleague,    Thank you for referring your patient, Mary Martinez, to the [facility]. Please see a copy of my visit note below.    HPI:   CC: Mary Martinez is a 32 year old female who presents for Full skin cancer screening to rule out skin cancer.   Last Skin Exam: 2 years ago    1st Baseline: No  Personal HX of Skin Cancer: no   Personal HX of Malignant Melanoma: no   Family HX of Skin Cancer / Malignant Melanoma: no  Personal HX of Atypical Moles: yes mildly atypical nevus removed  Risk factors: sun exposure with hx of tanning bed use for 1.5 years during high school.    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   On review of systems, there are no further skin complaints, patient is feeling otherwise well.  See patient intake sheet.  ROS of the following were done and are negative: Constitutional, Eyes, Ears, Nose,   Mouth, Throat, Cardiovascular, Respiratory, GI, Genitourinary, Musculoskeletal,   Psychiatric, Endocrine, Allergic/Immunologic.      PHYSICAL EXAM:   There were no vitals taken for this visit.  Skin exam performed as follows: Type 2 skin. Mood appropriate  Alert and Oriented X 3. Well developed, well nourished in no distress.  General appearance: Normal  Head including face: Normal  Eyes: conjunctiva and lids: Normal  Mouth: Lips, teeth, gums: Normal  Neck: Normal  Chest-breast/axillae: Normal  Back: Normal  Spleen and liver: Normal  Cardiovascular: Exam of peripheral vascular system by observation for swelling, varicosities, edema: Normal  Genitalia: groin, buttocks: Normal  Extremities: digits/nails (clubbing): Normal  Eccrine and Apocrine glands: Normal  Right upper extremity: Normal  Left upper extremity: Normal  Right lower extremity:  Normal  Left lower extremity: Normal  Skin: Scalp and body hair: See below    Pt deferred exam of breasts, groin, buttocks: No    Other physical findings:  1. Multiple pigmented macules on extremities and trunk  2. Multiple pigmented macules on face, trunk and extremities  3. Multiple vascular papules on trunk, arms and legs  4. 3 mm dark brown macule on the left lower calf       Except as noted above, no other signs of skin cancer or melanoma.     ASSESSMENT/PLAN:   Benign Full skin cancer screening today. Patient with no history of skin cancer  Advised on monthly self exams and 1 year  Patient Education: Appropriate brochures given.    Multiple benign appearing nevi on arms, legs and trunk. Discussed ABCDEs of melanoma and sunscreen.   Multiple lentigos on arms, legs and trunk. Advised benign, no treatment needed.  Multiple scattered angiomas. Advised benign, no treatment needed.   Atypical nevus on the left lower calf. Shave biopsy performed.  Area cleaned and anesthetized with 1% lidocaine with epinephrine.  Dermablade used to remove the lesion and sent to pathology. Bleeding was cauterized. Pt tolerated procedure well with no complications.       Follow-up: yearly FSE/PRN sooner    1.) Patient was asked about new and changing moles. YES  2.) Patient received a complete physical skin examination: YES  3.) Patient was counseled to perform a monthly self skin examination: YES  Scribed By: Neda Campbell, MS, PAQUIRINO        Again, thank you for allowing me to participate in the care of your patient.        Sincerely,        Neda Campbell PA-C

## 2022-07-25 LAB
PATH REPORT.COMMENTS IMP SPEC: NORMAL
PATH REPORT.FINAL DX SPEC: NORMAL
PATH REPORT.GROSS SPEC: NORMAL
PATH REPORT.MICROSCOPIC SPEC OTHER STN: NORMAL
PATH REPORT.RELEVANT HX SPEC: NORMAL

## 2022-11-19 ENCOUNTER — HEALTH MAINTENANCE LETTER (OUTPATIENT)
Age: 32
End: 2022-11-19

## 2023-11-05 ENCOUNTER — APPOINTMENT (OUTPATIENT)
Dept: ULTRASOUND IMAGING | Facility: CLINIC | Age: 33
End: 2023-11-05
Attending: FAMILY MEDICINE
Payer: COMMERCIAL

## 2023-11-05 ENCOUNTER — HOSPITAL ENCOUNTER (EMERGENCY)
Facility: CLINIC | Age: 33
Discharge: HOME | End: 2023-11-05
Attending: FAMILY MEDICINE | Admitting: FAMILY MEDICINE
Payer: COMMERCIAL

## 2023-11-05 VITALS
HEIGHT: 69 IN | WEIGHT: 161 LBS | TEMPERATURE: 97.7 F | DIASTOLIC BLOOD PRESSURE: 59 MMHG | RESPIRATION RATE: 16 BRPM | OXYGEN SATURATION: 97 % | BODY MASS INDEX: 23.85 KG/M2 | HEART RATE: 63 BPM | SYSTOLIC BLOOD PRESSURE: 108 MMHG

## 2023-11-05 DIAGNOSIS — O03.4 INCOMPLETE MISCARRIAGE: ICD-10-CM

## 2023-11-05 LAB
ABO/RH(D): NORMAL
ALBUMIN SERPL BCG-MCNC: 4.6 G/DL (ref 3.5–5.2)
ALP SERPL-CCNC: 38 U/L (ref 35–104)
ALT SERPL W P-5'-P-CCNC: 37 U/L (ref 0–50)
ANION GAP SERPL CALCULATED.3IONS-SCNC: 11 MMOL/L (ref 7–15)
AST SERPL W P-5'-P-CCNC: 30 U/L (ref 0–45)
BASOPHILS # BLD AUTO: 0 10E3/UL (ref 0–0.2)
BASOPHILS NFR BLD AUTO: 0 %
BILIRUB SERPL-MCNC: 0.2 MG/DL
BUN SERPL-MCNC: 13 MG/DL (ref 6–20)
CALCIUM SERPL-MCNC: 9.6 MG/DL (ref 8.6–10)
CHLORIDE SERPL-SCNC: 100 MMOL/L (ref 98–107)
CREAT SERPL-MCNC: 0.74 MG/DL (ref 0.51–0.95)
DEPRECATED HCO3 PLAS-SCNC: 24 MMOL/L (ref 22–29)
EGFRCR SERPLBLD CKD-EPI 2021: >90 ML/MIN/1.73M2
EOSINOPHIL # BLD AUTO: 0.1 10E3/UL (ref 0–0.7)
EOSINOPHIL NFR BLD AUTO: 1 %
ERYTHROCYTE [DISTWIDTH] IN BLOOD BY AUTOMATED COUNT: 12.8 % (ref 10–15)
FETAL BLEED SCREEN: NORMAL
GLUCOSE SERPL-MCNC: 96 MG/DL (ref 70–99)
HCG INTACT+B SERPL-ACNC: ABNORMAL MIU/ML
HCT VFR BLD AUTO: 36.4 % (ref 35–47)
HGB BLD-MCNC: 12.6 G/DL (ref 11.7–15.7)
IMM GRANULOCYTES # BLD: 0 10E3/UL
IMM GRANULOCYTES NFR BLD: 0 %
LYMPHOCYTES # BLD AUTO: 2.5 10E3/UL (ref 0.8–5.3)
LYMPHOCYTES NFR BLD AUTO: 31 %
MCH RBC QN AUTO: 31.2 PG (ref 26.5–33)
MCHC RBC AUTO-ENTMCNC: 34.6 G/DL (ref 31.5–36.5)
MCV RBC AUTO: 90 FL (ref 78–100)
MONOCYTES # BLD AUTO: 0.5 10E3/UL (ref 0–1.3)
MONOCYTES NFR BLD AUTO: 6 %
NEUTROPHILS # BLD AUTO: 4.9 10E3/UL (ref 1.6–8.3)
NEUTROPHILS NFR BLD AUTO: 62 %
NRBC # BLD AUTO: 0 10E3/UL
NRBC BLD AUTO-RTO: 0 /100
PLATELET # BLD AUTO: 253 10E3/UL (ref 150–450)
POTASSIUM SERPL-SCNC: 4.1 MMOL/L (ref 3.4–5.3)
PROT SERPL-MCNC: 7 G/DL (ref 6.4–8.3)
RBC # BLD AUTO: 4.04 10E6/UL (ref 3.8–5.2)
SODIUM SERPL-SCNC: 135 MMOL/L (ref 135–145)
SPECIMEN EXPIRATION DATE: NORMAL
WBC # BLD AUTO: 8 10E3/UL (ref 4–11)

## 2023-11-05 PROCEDURE — 36415 COLL VENOUS BLD VENIPUNCTURE: CPT | Performed by: FAMILY MEDICINE

## 2023-11-05 PROCEDURE — 96374 THER/PROPH/DIAG INJ IV PUSH: CPT

## 2023-11-05 PROCEDURE — 99284 EMERGENCY DEPT VISIT MOD MDM: CPT | Performed by: FAMILY MEDICINE

## 2023-11-05 PROCEDURE — 84702 CHORIONIC GONADOTROPIN TEST: CPT | Performed by: FAMILY MEDICINE

## 2023-11-05 PROCEDURE — 99285 EMERGENCY DEPT VISIT HI MDM: CPT | Mod: 25

## 2023-11-05 PROCEDURE — 80053 COMPREHEN METABOLIC PANEL: CPT | Performed by: FAMILY MEDICINE

## 2023-11-05 PROCEDURE — 250N000011 HC RX IP 250 OP 636

## 2023-11-05 PROCEDURE — 85004 AUTOMATED DIFF WBC COUNT: CPT | Performed by: FAMILY MEDICINE

## 2023-11-05 PROCEDURE — 76801 OB US < 14 WKS SINGLE FETUS: CPT

## 2023-11-05 PROCEDURE — 85461 HEMOGLOBIN FETAL: CPT | Performed by: FAMILY MEDICINE

## 2023-11-05 ASSESSMENT — ACTIVITIES OF DAILY LIVING (ADL): ADLS_ACUITY_SCORE: 35

## 2023-11-05 NOTE — ED TRIAGE NOTES
est 11 wks gest presents with vag bleed.  Onset today.  Pt believes she may have passed some tissue.      Triage Assessment (Adult)       Row Name [phone]          Triage Assessment    Airway WDL WDL        Cognitive/Neuro/Behavioral WDL    Cognitive/Neuro/Behavioral WDL WDL

## 2023-11-06 NOTE — DISCHARGE INSTRUCTIONS
If your bleeding or pain do not resolve after 3 days, follow-up in OB/GYN to discuss options for evacuating the remaining products of conception.  You can try to become pregnant again after you have another menstrual cycle and do a pregnancy test that is negative.

## 2023-11-06 NOTE — ED PROVIDER NOTES
History     Chief Complaint   Patient presents with    Vaginal Bleeding - Pregnant     HPI    Mary Martinez is a 33 year old female  002 with LMP 2023, 11 weeks gestation by dates presents with vaginal bleeding that began today.  This is about as heavy as a normal period but she also thinks she soft tissue in the discharge.  Had some cramping with this but not severe pain she has not had fevers, chills, vomiting, she has not recently been ill.  Her only medication is citalopram.  Has never had any abdominal surgeries.  Her prior pregnancies were all uncomplicated.  Her blood type is A negative.    Allergies:  No Known Allergies    Problem List:    Patient Active Problem List    Diagnosis Date Noted    IUD (intrauterine device) in place 2019     Priority: Medium    Breast feeding status of mother 2019     Priority: Medium    Routine postpartum follow-up 2016     Priority: Medium    CARDIOVASCULAR SCREENING; LDL GOAL LESS THAN 160 10/31/2010     Priority: Medium        Past Medical History:    Past Medical History:   Diagnosis Date    Chickenpox     Chlamydia        Past Surgical History:    Past Surgical History:   Procedure Laterality Date    NO HISTORY OF SURGERY         Family History:    Family History   Problem Relation Age of Onset    Lipids Father     Hypertension Maternal Grandmother     Heart Disease Maternal Grandmother     Hypertension Paternal Grandfather     Cerebrovascular Disease Paternal Grandfather     Connective Tissue Disorder Paternal Grandfather     Coronary Artery Disease Paternal Grandfather         MI    Hypertension Maternal Grandfather     Respiratory Maternal Grandfather         COPD    Coronary Artery Disease Maternal Grandfather         MI-stent    Hypertension Paternal Grandmother     Arthritis Paternal Grandmother     Eye Disorder Paternal Grandmother     Respiratory Paternal Grandmother         COPD    Depression Mother     Depression Maternal Aunt      "Obesity Maternal Aunt     Genetic Disorder Brother         Menkes-  age 2    Breast Cancer No family hx of        Social History:  Marital Status:  Single [1]  Social History     Tobacco Use    Smoking status: Never    Smokeless tobacco: Never   Vaping Use    Vaping Use: Never used   Substance Use Topics    Alcohol use: Yes     Comment: occas- quit with pregnancy    Drug use: No        Medications:    citalopram (CELEXA) 20 MG tablet  vitamin D3 (CHOLECALCIFEROL) 2000 units tablet          Review of Systems  All other systems are reviewed and are negative    Physical Exam   BP: 119/80  Pulse: 84  Temp: 98.2  F (36.8  C)  Resp: 18  Height: 175.3 cm (5' 9\")  Weight: 73 kg (161 lb)  SpO2: 99 %      Physical Exam  Nursing note and vitals were reviewed.  Constitutional: Awake and alert, adequately nourished and developed appearing 33-year-old in no apparent discomfort, who does not appear acutely ill, and who answers questions appropriately and cooperates with examination.  HEENT: Voice quality is normal.  PERRL.  EOMI.   Neck: Freely mobile.  Cardiovascular: Cardiac examination reveals normal heart rate and regular rhythm without murmur.  Pulmonary/Chest: Breathing is unlabored.  Breath sounds are clear and equal bilaterally.  There no retractions, tachypnea, rales, wheezes, or rhonchi.  Abdomen: Soft, nontender, no HSM or masses rebound or guarding.  Musculoskeletal: Extremities are warm and well-perfused and without edema  Neurological: Alert, oriented, thought content logical, coherent   Skin: Warm, dry, no rashes.  Psychiatric: Affect broad and appropriate.  Pelvic examination: The cervix is anteverted.  The cervix has some discharge with blood and tissue looking material but the bleeding is quite mild and there is no cervical motion tenderness and no tissue stuck in the os.    ED Course                 Procedures              Critical Care time:  none               Results for orders placed or performed during " the hospital encounter of 11/05/23 (from the past 24 hour(s))   RH Immune Globulin Screen   Result Value Ref Range    ABO/RH(D) A NEG     Fetal Bleed Screen NEG Negative    SPECIMEN EXPIRATION DATE [id number]    CBC with platelets differential    Narrative    The following orders were created for panel order CBC with platelets differential.  Procedure                               Abnormality         Status                     ---------                               -----------         ------                     CBC with platelets and d...[[id number]]                      Final result                 Please view results for these tests on the individual orders.   Comprehensive metabolic panel   Result Value Ref Range    Sodium 135 135 - 145 mmol/L    Potassium 4.1 3.4 - 5.3 mmol/L    Carbon Dioxide (CO2) 24 22 - 29 mmol/L    Anion Gap 11 7 - 15 mmol/L    Urea Nitrogen 13.0 6.0 - 20.0 mg/dL    Creatinine 0.74 0.51 - 0.95 mg/dL    GFR Estimate >90 >60 mL/min/1.73m2    Calcium 9.6 8.6 - 10.0 mg/dL    Chloride 100 98 - 107 mmol/L    Glucose 96 70 - 99 mg/dL    Alkaline Phosphatase 38 35 - 104 U/L    AST 30 0 - 45 U/L    ALT 37 0 - 50 U/L    Protein Total 7.0 6.4 - 8.3 g/dL    Albumin 4.6 3.5 - 5.2 g/dL    Bilirubin Total 0.2 <=1.2 mg/dL   HCG quantitative pregnancy   Result Value Ref Range    hCG Quantitative 25,815 (H) <5 mIU/mL   CBC with platelets and differential   Result Value Ref Range    WBC Count 8.0 4.0 - 11.0 10e3/uL    RBC Count 4.04 3.80 - 5.20 10e6/uL    Hemoglobin 12.6 11.7 - 15.7 g/dL    Hematocrit 36.4 35.0 - 47.0 %    MCV 90 78 - 100 fL    MCH 31.2 26.5 - 33.0 pg    MCHC 34.6 31.5 - 36.5 g/dL    RDW 12.8 10.0 - 15.0 %    Platelet Count 253 150 - 450 10e3/uL    % Neutrophils 62 %    % Lymphocytes 31 %    % Monocytes 6 %    % Eosinophils 1 %    % Basophils 0 %    % Immature Granulocytes 0 %    NRBCs per 100 WBC 0 <1 /100    Absolute Neutrophils 4.9 1.6 - 8.3 10e3/uL    Absolute Lymphocytes 2.5 0.8 -  5.3 10e3/uL    Absolute Monocytes 0.5 0.0 - 1.3 10e3/uL    Absolute Eosinophils 0.1 0.0 - 0.7 10e3/uL    Absolute Basophils 0.0 0.0 - 0.2 10e3/uL    Absolute Immature Granulocytes 0.0 <=0.4 10e3/uL    Absolute NRBCs 0.0 10e3/uL   US OB <14 Weeks W Transvaginal    Narrative    EXAM: US OB <14 WEEKS WITH TRANSVAGINAL SINGLE  LOCATION: [facility]  DATE: 2023    INDICATION: bleeding cramping, LMP 8 20, 11 weeks by LMP  COMPARISON: None.  TECHNIQUE: Transabdominal scans were performed. Endovaginal ultrasound was performed to better visualize the embryo.    FINDINGS:  UTERUS: An intrauterine pregnancy is identified and includes a fetal pole. However, no heartbeat is identified.  CRL: Measures 0.5 cm, equals 6 weeks 2 days.  FETAL HEART RATE: 0 bpm.  AMNIOTIC FLUID: Normal.  PLACENTA: A subchorionic hemorrhage is present.    RIGHT OVARY: Normal.  LEFT OVARY: Normal.      Impression    IMPRESSION:   1.  Intrauterine pregnancy without heart beat consistent with nonviable pregnancy/missed .           Medications   rho (D) Immune Globulin (RHOPHYLAC) 300 mcg/2 ml (has no administration in time range)       Assessments & Plan (with Medical Decision Making)     33-year-old -0-0-2 presented at 11 weeks gestation by LMP with cramping and bleeding.  Physical examination showed some products of conception in the vaginal vault but no heavy bleeding.  Vital signs were normal.  Beta hCG level was above 28,000.  Blood type is A-.  Ultrasound shows above findings with an intrauterine pregnancy with no fetal heart tones with a pregnancy 6 weeks size consistent with incomplete miscarriage.  RhoGAM was administered.  I discussed the findings with the patient and her  and that this represents an incomplete miscarriage.  I have referred her to OB/GYN for follow-up should it not complete.  We discussed signs symptoms that should prompt return including fevers, increased pain, persistent  bleeding, or other worrisome symptoms.    I have reviewed the nursing notes.    I have reviewed the findings, diagnosis, plan and need for follow up with the patient.         New Prescriptions    No medications on file       Final diagnoses:   Incomplete miscarriage       11/5/2023   [facility] EMERGENCY DEPT       Alvino Clancy MD  11/05/23 1945

## 2023-11-09 ENCOUNTER — MYC MEDICAL ADVICE (OUTPATIENT)
Dept: OBGYN | Facility: CLINIC | Age: 33
End: 2023-11-09

## 2023-11-09 ENCOUNTER — OFFICE VISIT (OUTPATIENT)
Dept: OBGYN | Facility: CLINIC | Age: 33
End: 2023-11-09
Payer: COMMERCIAL

## 2023-11-09 VITALS
HEART RATE: 79 BPM | SYSTOLIC BLOOD PRESSURE: 113 MMHG | BODY MASS INDEX: 24.14 KG/M2 | DIASTOLIC BLOOD PRESSURE: 71 MMHG | HEIGHT: 69 IN | RESPIRATION RATE: 16 BRPM | WEIGHT: 163 LBS

## 2023-11-09 DIAGNOSIS — O03.9 MISCARRIAGE: Primary | ICD-10-CM

## 2023-11-09 DIAGNOSIS — O02.1 MISSED ABORTION: Primary | ICD-10-CM

## 2023-11-09 PROCEDURE — 99203 OFFICE O/P NEW LOW 30 MIN: CPT | Performed by: OBSTETRICS & GYNECOLOGY

## 2023-11-09 RX ORDER — LOPERAMIDE HYDROCHLORIDE 2 MG/1
2 TABLET ORAL 4 TIMES DAILY PRN
Qty: 10 TABLET | Refills: 0 | Status: SHIPPED | OUTPATIENT
Start: 2023-11-09 | End: 2023-11-09

## 2023-11-09 RX ORDER — MISOPROSTOL 200 UG/1
800 TABLET ORAL
Qty: 12 TABLET | Refills: 0 | Status: SHIPPED | OUTPATIENT
Start: 2023-11-09 | End: 2023-12-11

## 2023-11-09 RX ORDER — MISOPROSTOL 200 UG/1
800 TABLET ORAL
Qty: 12 TABLET | Refills: 0 | Status: SHIPPED | OUTPATIENT
Start: 2023-11-09 | End: 2023-11-09

## 2023-11-09 RX ORDER — LOPERAMIDE HYDROCHLORIDE 2 MG/1
2 TABLET ORAL 4 TIMES DAILY PRN
Qty: 10 TABLET | Refills: 0 | Status: SHIPPED | OUTPATIENT
Start: 2023-11-09 | End: 2023-12-11

## 2023-11-09 RX ORDER — ONDANSETRON 4 MG/1
4 TABLET, FILM COATED ORAL EVERY 6 HOURS PRN
Qty: 10 TABLET | Refills: 0 | Status: SHIPPED | OUTPATIENT
Start: 2023-11-09 | End: 2023-11-09

## 2023-11-09 RX ORDER — ONDANSETRON 4 MG/1
4 TABLET, FILM COATED ORAL EVERY 6 HOURS PRN
Qty: 10 TABLET | Refills: 0 | Status: SHIPPED | OUTPATIENT
Start: 2023-11-09 | End: 2023-12-11

## 2023-11-09 NOTE — PROGRESS NOTES
"  Confirmed Early Pregnancy Loss - Outpatient Visit    Subjective:  Mary Martinez is a 33 year old female coming today for management of likely early pregnancy loss. She has noticed the following:  - loss of pregnancy symptoms  - lower abdominal pain  - vaginal spotting/bleeding mild  Patient diagnosed with early pregnancy loss at the following time/location: 2023.   She was counseled in the ED on 2023 that the fetal pole had no cardiac activity and was at least a month behind in measured growth    ROS: All other review of symptoms was otherwise negative except as noted in HPI    Dating:  LMP: Patient's last menstrual period was 2023 (exact date).  Ultrasound results:   vaginal  Intrauterine gestational sac seen: yes  Gestational sac summary: fetal pole seen, yolk sac seen, MSD=1.7  Fetal/Embryonic cardiac activity: absent  Crown-rump length:   Adnexa: no masses seen          Serum beta hC on  which IS congruent with early pregnancy loss.   Hemoglobin: 12.6  RH Status (if >8wks GA): A- s/p Rhogam on 2023    OB Hx:  OB History    Para Term  AB Living   3 2 2 0 1 1   SAB IAB Ectopic Multiple Live Births   1 0 0 0 1      # Outcome Date GA Lbr Karri/2nd Weight Sex Delivery Anes PTL Lv   3 SAB 23           2 Term 19    M          Name: Benoit   1 Term 16 41w2d 05:35 / 02:45 3.912 kg (8 lb 10 oz) M Vag-Spont EPI  MAG      Name: Miguel      Apgar1: 8  Apgar5: 9       Objective:  /71 (BP Location: Right arm, Patient Position: Sitting, Cuff Size: Adult Regular)   Pulse 79   Resp 16   Ht 1.753 m (5' 9\")   Wt 73.9 kg (163 lb)   LMP 2023 (Exact Date)   Breastfeeding No   BMI 24.07 kg/m    Vitals were reviewed and were normal    Gen: well-appearing, cooperative, well-groomed  Abd: soft NT  :   Assessment and Plan:  First-trimester pregnancy loss - this based on meeting Doubliet ultrasound criteria and serum beta-hCG levels.    We reviewed the " patient's options at length including the risks and benefits or expectant management, medical management using misoprostol alone, mifepristone and misoprostol, and aspiration procedure with local anesthesia and suction evacuation. Patient IS vitally stable and IS appropriate to manage as an outpatient.    The patient has elected and is appropriate for medical management with misoprostol.  Patient plans to take 800mcg by vaginal route every four hours for a total of three doses. Medication Management  - confirmed patient is within gestational age limit (11w6d or less)  - obtain serum hCG (if applicable)  - obtain hemoglobin and type and screen (if patent is >8wk GA and if not already known, or if not performed within the window of onset of bleeding or diagnosis of EPL)  - reviewed expected and serious side effects from mifepristone  - reviewed how to take misoprostol and expected side effects  - reviewed that mifepristone/misoprostol may not be effective and patient may require surgical management of miscarriage.   - confirmed and addressed any drug-drug interactions with patient's concurrent medicines, including vitamins and herbal supplements  - provided prescription of ibuprofen for cramping, loperamide for diarrhea and a medicine for nausea  - RHOGAM administered (if Rh negative and >8 wks GA)  - call or return to clinic in 1-2 weeks if no bleeding or obvious passage of POC's has occurred  - chart routed to RN team who will follow up with patient via telephone in 1-2 days (or next business day) after clinic visit to assess patient      Betito Rausch MD

## 2023-11-09 NOTE — PATIENT INSTRUCTIONS
MISCARRIAGE MANAGEMENT USING MEDICATIONS     HOW DOES IT WORK?   Pills called mifepristone and misoprostol can help an early pregnancy loss to end faster.     HOW WELL DOES THE MEDICATION WORK?   These pills work 84% of the time to complete the miscarriage within two days and 89% of the time within a week. If you can t get mifepristone, misoprostol works 67% of the time within two days and 84% of the time within a week when used alone.     IS IT SAFE?   Yes, pills for pregnancy loss are safe. Using pills does not lower your chance of getting pregnant again.     WHAT DO I DO?   You took one tablet of 200 mg mifepristone today during your visit.  About 24 hours after taking the mifepristone, use the misoprostol at home. Choose a time when you have had a good meal and plenty of rest.   Swallow ibuprofen (600 mg total) one hour before using the misoprostol. This will help with side effects.   Vaginal Use of Misoprostol  Wash your hands and lie down. Push the four misoprostol tablets one at a time up into the vagina as far as you can using your finger. It doesn t matter where in the vagina you put the pills. Each misoprostol pill contains 200 micrograms.   Keep lying down for 30 minutes.   After 30 minutes, you can move around as usual. If the tablets come out after 30 minutes, it is OK. Your body has absorbed the medication.        Buccal Use of Misoprostol  Place 2 tablets of misoprostol in each cheek (between your cheek and your gums)  Let these tablets dissolve, whatever has not dissolved within 30 minutes can be swallowed with a large glass of water  If you do not start to bleed within 24 hours give yourself a second dose of misoprostol. Do this by repeating step 4 or 5 above.     WHAT HAPPENS NEXT?  Cramps and bleeding with clots are an expected part of this process. The cramps and bleeding may be stronger than your normal period. The cramps usually start 2 to 4 hours after you use the misoprostol pills and may  last 3 to 5 hours. You may start to have vaginal bleeding before you use the pills I have given you. This heavy bleeding is not risky. It means the pills are working. The bleeding often lasts 1 to 2 weeks, and may stop and start a few times.   Nausea, diarrhea, or chills: These symptoms should get better a few hours after using the pills and should not last longer than 24 hours.   You need to have an appointment in the clinic in 1-2 weeks. This was made for you today. At this visit we will ensure that the treatment was effective. If it was not effective, we will talk about next steps.        WHAT CAN I TAKE FOR PAIN, NAUSEA, DIARRHEA?   Pain:   Take ibuprofen (Motrin or Advil) 800 mg every 8 hours as needed for pain. Take this with food to avoid an upset stomach.   You may receive a stronger, narcotic pain medicine. You can use this if you still have pain after taking ibuprofen. Follow the directions on the label.   Comfort: A hot pack may help with cramps. You can also drink some hot tea. Rest in a soothing place.   Nausea  Take ondansetron or promethazine as needed for nausea and vomiting as needed for nausea and vomiting.   Diarrhea  Take Loperamide as needed for diarrhea. Take 2 capsules after the first loose bowel movement. Can take 1 capsule after each additional loose stool. Do not take more than 8 capsules in a day.    WHAT IF IT TAKES TOO LONG OR DOESN T WORK?   If it doesn t work or you feel it is taking too long, please call the clinic and we can discuss the next steps.    WHEN SHOULD I CALL OR RETURN TO MY HEALTH CARE PROVIDER?   Your bleeding soaks through more than 2 maxi pads per hour for 2 hours in a row.  You have a fever on an oral thermometer of 100.4 degrees Fahrenheit   You have severe stomach area (abdominal) pain   You continue to feel sick 24 hours after taking the misoprostol, this includes stomach pain or discomfort, weakness, nausea, vomiting, or diarrhea    The clinic phone is answered 24  hours per day. If it is after clinic hours, leave a message with the answering service and a physician will call you back. Please call if you have any questions, think something is going wrong, or think you have an emergency. No question is too small. If you do not receive a call back within an hour, go to the nearest emergency room.     HOW SOON CAN I GET PREGNANT AGAIN?   You can get pregnant soon after the pregnancy loss ends. If you want to try again, take a prenatal vitamin daily. If you are not ready to be pregnant at this time, please see your clinician for your birth control options.     FEELINGS AFTER EARLY PREGNANCY LOSS   Most women feel mixed emotions after a miscarriage. It often depends upon how much time you had to adjust to the news of the pregnancy loss. These up and down feelings are partly from the changes in hormones. Please understand that nothing you did caused the pregnancy loss. It is NOT caused by stress, sports, food, or sex. Feeling emotional at this time is normal. If you think your emotions are not what they should be, please talk to us.           MISCARRIAGE MANAGEMENT USING MEDICATIONS     HOW DOES IT WORK?   Pills called mifepristone and misoprostol can help an early pregnancy loss to end faster.     HOW WELL DOES THE MEDICATION WORK?   These pills work 84% of the time to complete the miscarriage within two days and 89% of the time within a week. If you can t get mifepristone, misoprostol works 67% of the time within two days and 84% of the time within a week when used alone.     IS IT SAFE?   Yes, pills for pregnancy loss are safe. Using pills does not lower your chance of getting pregnant again.     WHAT DO I DO?   You took one tablet of 200 mg mifepristone today during your visit.  About 24 hours after taking the mifepristone, use the misoprostol at home. Choose a time when you have had a good meal and plenty of rest.   Swallow ibuprofen (600 mg total) one hour before using the  misoprostol. This will help with side effects.   Vaginal Use of Misoprostol  Wash your hands and lie down. Push the four misoprostol tablets one at a time up into the vagina as far as you can using your finger. It doesn t matter where in the vagina you put the pills. Each misoprostol pill contains 200 micrograms.   Keep lying down for 30 minutes.   After 30 minutes, you can move around as usual. If the tablets come out after 30 minutes, it is OK. Your body has absorbed the medication.        Buccal Use of Misoprostol  Place 2 tablets of misoprostol in each cheek (between your cheek and your gums)  Let these tablets dissolve, whatever has not dissolved within 30 minutes can be swallowed with a large glass of water  If you do not start to bleed within 24 hours give yourself a second dose of misoprostol. Do this by repeating step 4 or 5 above.     WHAT HAPPENS NEXT?  Cramps and bleeding with clots are an expected part of this process. The cramps and bleeding may be stronger than your normal period. The cramps usually start 2 to 4 hours after you use the misoprostol pills and may last 3 to 5 hours. You may start to have vaginal bleeding before you use the pills I have given you. This heavy bleeding is not risky. It means the pills are working. The bleeding often lasts 1 to 2 weeks, and may stop and start a few times.   Nausea, diarrhea, or chills: These symptoms should get better a few hours after using the pills and should not last longer than 24 hours.   You need to have an appointment in the clinic in 1-2 weeks. This was made for you today. At this visit we will ensure that the treatment was effective. If it was not effective, we will talk about next steps.        WHAT CAN I TAKE FOR PAIN, NAUSEA, DIARRHEA?   Pain:   Take ibuprofen (Motrin or Advil) 800 mg every 8 hours as needed for pain. Take this with food to avoid an upset stomach.   You may receive a stronger, narcotic pain medicine. You can use this if you  still have pain after taking ibuprofen. Follow the directions on the label.   Comfort: A hot pack may help with cramps. You can also drink some hot tea. Rest in a soothing place.   Nausea  Take ondansetron or promethazine as needed for nausea and vomiting as needed for nausea and vomiting.   Diarrhea  Take Loperamide as needed for diarrhea. Take 2 capsules after the first loose bowel movement. Can take 1 capsule after each additional loose stool. Do not take more than 8 capsules in a day.    WHAT IF IT TAKES TOO LONG OR DOESN T WORK?   If it doesn t work or you feel it is taking too long, please call the clinic and we can discuss the next steps.    WHEN SHOULD I CALL OR RETURN TO MY HEALTH CARE PROVIDER?   Your bleeding soaks through more than 2 maxi pads per hour for 2 hours in a row.  You have a fever on an oral thermometer of 100.4 degrees Fahrenheit   You have severe stomach area (abdominal) pain   You continue to feel sick 24 hours after taking the misoprostol, this includes stomach pain or discomfort, weakness, nausea, vomiting, or diarrhea    The clinic phone is answered 24 hours per day. If it is after clinic hours, leave a message with the answering service and a physician will call you back. Please call if you have any questions, think something is going wrong, or think you have an emergency. No question is too small. If you do not receive a call back within an hour, go to the nearest emergency room.     HOW SOON CAN I GET PREGNANT AGAIN?   You can get pregnant soon after the pregnancy loss ends. If you want to try again, take a prenatal vitamin daily. If you are not ready to be pregnant at this time, please see your clinician for your birth control options.     FEELINGS AFTER EARLY PREGNANCY LOSS   Most women feel mixed emotions after a miscarriage. It often depends upon how much time you had to adjust to the news of the pregnancy loss. These up and down feelings are partly from the changes in hormones.  Please understand that nothing you did caused the pregnancy loss. It is NOT caused by stress, sports, food, or sex. Feeling emotional at this time is normal. If you think your emotions are not what they should be, please talk to us.

## 2023-11-13 NOTE — TELEPHONE ENCOUNTER
S-(situation): Pt requesting HCG quant prior to taking miso for early preg loss     B-(background): ; LMP: 23 Last appt: 23 with Dr. Rausch for early pregnancy loss  Pt elected to take Miso and will be taking this coming weekend     A-(assessment): Pt denies pregnancy loss symptoms and would like to have her HCG levels checked this week prior to taking miso  Pt would like to let pregnancy pass naturally without medication if possible     R-(recommendations): Routing to provider for review and recommendations  HCG quant standing order in pending status if deemed appropriate    ThanksNicole, RN  [facility]

## 2023-11-17 ENCOUNTER — LAB (OUTPATIENT)
Dept: LAB | Facility: CLINIC | Age: 33
End: 2023-11-17
Payer: COMMERCIAL

## 2023-11-17 DIAGNOSIS — O03.9 MISCARRIAGE: ICD-10-CM

## 2023-11-17 LAB — HCG INTACT+B SERPL-ACNC: 8106 MIU/ML

## 2023-11-17 PROCEDURE — 36415 COLL VENOUS BLD VENIPUNCTURE: CPT

## 2023-11-17 PROCEDURE — 84702 CHORIONIC GONADOTROPIN TEST: CPT

## 2023-11-18 NOTE — RESULT ENCOUNTER NOTE
Mary   Here is your pregnancy hormone level.  There is no doubt that the pregnancy stopped developing as we suspected.  There is no magic level at which the passage of tissue will begin.  I know that you have the pills.   You can use them at your convenience.  Wishing you well.  Betito Rausch MD

## 2023-11-21 NOTE — TELEPHONE ENCOUNTER
S-(situation): Pt has not taken miso for SAB and wanting to wait until this weekend    B-(background): ; Last appt 23 with Dr. Rausch for early pregnancy loss - pt dx with early loss in ED at 11 wks with fetus at [id number]   Pt planned to take miso to induce miscarriage    A-(assessment): Pt has not had any miscarriage symptoms and has not taken the medication yet   She is wondering if it is safe to wait to take the miso until this weekend    R-(recommendations): Routing to provider to review and advise.     Nicole RN  [facility]

## 2023-11-21 NOTE — TELEPHONE ENCOUNTER
Huddled with Dr. Rausch   He is comfortable with patient taking medication when she is able to take it.   Any signs or symptoms of infection, would recommend further evaluation in the ER.    Brionna FELIPE   [facility]

## 2023-12-01 ENCOUNTER — TELEPHONE (OUTPATIENT)
Dept: OBGYN | Facility: CLINIC | Age: 33
End: 2023-12-01
Payer: COMMERCIAL

## 2023-12-01 NOTE — TELEPHONE ENCOUNTER
[facility] Call Center    Phone Message    May a detailed message be left on voicemail: yes     Reason for Call: Other: Pt needs to take another dose of a medication in 20 minutes, btu is unsure of how much. Please contact pt asap.      Action Taken: Message routed to:  Other: [place] [facility]    Travel Screening: Not Applicable

## 2023-12-01 NOTE — TELEPHONE ENCOUNTER
S-(situation): Pt wondering about taking next dose of medication    B-(background): ; Last appt: 23 with Dr. Rausch for incomplete miscarriage  Pt was prescribed miso     A-(assessment): took medication at 0900 am and started bleeding at 0915 and has passed tissue - what she thinks may be gestational sac  Pt reports minimal cramping  Pt denies nausea/vomiting, blood clots, severe/heavy bleeding  Pt did take advil prior to first dose of Miso  Pt is wondering if she should continue taking medication    R-(recommendations): Encouraged pt to continue with medication as prescribed to make sure that all tissue has passed    Reviewed bleeding precautions and to call with new or worsening symptoms - pt verbalizes understanding and agrees with armando Rivera RN  [facility]

## 2023-12-11 ENCOUNTER — OFFICE VISIT (OUTPATIENT)
Dept: OBGYN | Facility: CLINIC | Age: 33
End: 2023-12-11
Payer: COMMERCIAL

## 2023-12-11 VITALS
BODY MASS INDEX: 23.96 KG/M2 | DIASTOLIC BLOOD PRESSURE: 71 MMHG | SYSTOLIC BLOOD PRESSURE: 109 MMHG | HEIGHT: 69 IN | TEMPERATURE: 97 F | WEIGHT: 161.8 LBS | HEART RATE: 81 BPM | RESPIRATION RATE: 16 BRPM

## 2023-12-11 DIAGNOSIS — Z23 NEED FOR PROPHYLACTIC VACCINATION AND INOCULATION AGAINST INFLUENZA: ICD-10-CM

## 2023-12-11 DIAGNOSIS — O03.9 MISCARRIAGE: Primary | ICD-10-CM

## 2023-12-11 PROCEDURE — 90471 IMMUNIZATION ADMIN: CPT | Performed by: OBSTETRICS & GYNECOLOGY

## 2023-12-11 PROCEDURE — 90686 IIV4 VACC NO PRSV 0.5 ML IM: CPT | Performed by: OBSTETRICS & GYNECOLOGY

## 2023-12-11 PROCEDURE — 99212 OFFICE O/P EST SF 10 MIN: CPT | Mod: 25 | Performed by: OBSTETRICS & GYNECOLOGY

## 2023-12-11 NOTE — PROGRESS NOTES
"    Chief Complaint   Patient presents with    Follow Up     miscarriage    Imm/Inj     Flu Shot         HPI:  Mary Martinez, 33 year old,  presents for follow up of a miscarriage.  She states everything went well.  She took misoprostol and her body responded immediately.  She passed a \"sack\" within an hour.  She had bleeding for about 1 1/2 days and then just spotting.  She has no fevers, chills or concerns.  She denied any side affects from the medication.  She is thinking they will wait until January to try again.      Past Medical History:   Diagnosis Date    Chickenpox     Chlamydia      Past Surgical History:   Procedure Laterality Date    NO HISTORY OF SURGERY       Social History     Socioeconomic History    Marital status: Single     Spouse name: Not on file    Number of children: Not on file    Years of education: Not on file    Highest education level: Not on file   Occupational History    Not on file   Tobacco Use    Smoking status: Never     Passive exposure: Never    Smokeless tobacco: Never   Vaping Use    Vaping Use: Never used   Substance and Sexual Activity    Alcohol use: Yes     Comment: occas- quit with pregnancy    Drug use: No    Sexual activity: Yes     Partners: Male   Other Topics Concern    Parent/sibling w/ CABG, MI or angioplasty before 65F 55M? No   Social History Narrative    Goes to [employer]    [employer] - occupational therapy             Social Determinants of Health     Financial Resource Strain: Not on file   Food Insecurity: Not on file   Transportation Needs: Not on file   Physical Activity: Not on file   Stress: Not on file   Social Connections: Not on file   Interpersonal Safety: Not on file   Housing Stability: Not on file     Family History   Problem Relation Age of Onset    Lipids Father     Hypertension Maternal Grandmother     Heart Disease Maternal Grandmother     Hypertension Paternal Grandfather     Cerebrovascular Disease Paternal Grandfather     Connective " "Tissue Disorder Paternal Grandfather     Coronary Artery Disease Paternal Grandfather         MI    Hypertension Maternal Grandfather     Respiratory Maternal Grandfather         COPD    Coronary Artery Disease Maternal Grandfather         MI-stent    Hypertension Paternal Grandmother     Arthritis Paternal Grandmother     Eye Disorder Paternal Grandmother     Respiratory Paternal Grandmother         COPD    Depression Mother     Depression Maternal Aunt     Obesity Maternal Aunt     Genetic Disorder Brother         Menkes-  age 2    Breast Cancer No family hx of         Current Outpatient Medications   Medication Sig Dispense Refill    citalopram (CELEXA) 20 MG tablet TAKE 1 TABLET (20 MG) BY MOUTH ONCE DAILY.      MAGNESIUM PO       Menaquinone-7 (VITAMIN K2 PO)       Prenatal Vit-Fe Fumarate-FA (PRENATAL MULTIVITAMIN  PLUS IRON) 27-1 MG TABS Take by mouth daily      Probiotic Product (PROBIOTIC PO)       vitamin D3 (CHOLECALCIFEROL) 2000 units tablet Take 2 tabs daily          Allergies:   No Known Allergies     ROS:  See HPI      Physical Exam:  /71 (BP Location: Right arm, Patient Position: Chair, Cuff Size: Adult Regular)   Pulse 81   Temp 97  F (36.1  C) (Tympanic)   Resp 16   Ht 1.753 m (5' 9\")   Wt 73.4 kg (161 lb 12.8 oz)   LMP 2023 (Exact Date)   Breastfeeding No   BMI 23.89 kg/m       Appearance: well developed, well nourished, no acute distress  Head Exam normocephalic, no lesions or deformities  Psych: orientated x3        Assessment/Plan:  Encounter Diagnoses   Name Primary?    Miscarriage Yes    Need for prophylactic vaccination and inoculation against influenza          We discussed miscarriages and questions were answered.  She is thinking she will try again in January.  Offered serum or urine HCG here to make sure it returned to normal.  She has multiple pregnancy tests at home and will check to make sure it is negative before trying again.         Germania Contreras MD " on 12/11/2023 at 2:22 PM

## 2023-12-11 NOTE — Clinical Note
Please abstract the following data from this visit with this patient into the appropriate field in Epic:  Tests that can be patient reported without a hard copy:    Other Tests found in the patient's chart through Chart Review/Care Everywhere:  Pap smear done by this group [facility] on this date: 4/13/2021 and HPV done by this group [facility] on this date: 4/13/2021  Note to Abstraction: If this section is blank, no results were found via Chart Review/Care Everywhere.

## 2023-12-11 NOTE — NURSING NOTE
"Initial /71 (BP Location: Right arm, Patient Position: Chair, Cuff Size: Adult Regular)   Pulse 81   Temp 97  F (36.1  C) (Tympanic)   Resp 16   Ht 1.753 m (5' 9\")   Wt 73.4 kg (161 lb 12.8 oz)   LMP 08/20/2023 (Exact Date)   Breastfeeding No   BMI 23.89 kg/m   Estimated body mass index is 23.89 kg/m  as calculated from the following:    Height as of this encounter: 1.753 m (5' 9\").    Weight as of this encounter: 73.4 kg (161 lb 12.8 oz). .    Yudelka Roberts CMA    "

## 2024-01-28 ENCOUNTER — HEALTH MAINTENANCE LETTER (OUTPATIENT)
Age: 34
End: 2024-01-28

## 2024-02-24 ENCOUNTER — HOSPITAL ENCOUNTER (EMERGENCY)
Facility: CLINIC | Age: 34
Discharge: HOME | End: 2024-02-24
Attending: EMERGENCY MEDICINE | Admitting: EMERGENCY MEDICINE
Payer: COMMERCIAL

## 2024-02-24 ENCOUNTER — APPOINTMENT (OUTPATIENT)
Dept: ULTRASOUND IMAGING | Facility: CLINIC | Age: 34
End: 2024-02-24
Attending: EMERGENCY MEDICINE
Payer: COMMERCIAL

## 2024-02-24 VITALS
DIASTOLIC BLOOD PRESSURE: 72 MMHG | SYSTOLIC BLOOD PRESSURE: 110 MMHG | TEMPERATURE: 98.1 F | HEIGHT: 70 IN | RESPIRATION RATE: 16 BRPM | HEART RATE: 68 BPM | BODY MASS INDEX: 23.73 KG/M2 | WEIGHT: 165.79 LBS | OXYGEN SATURATION: 98 %

## 2024-02-24 DIAGNOSIS — O03.4 INCOMPLETE MISCARRIAGE: ICD-10-CM

## 2024-02-24 LAB
ABO/RH(D): NORMAL
ANION GAP SERPL CALCULATED.3IONS-SCNC: 10 MMOL/L (ref 7–15)
ANTIBODY SCREEN: NEGATIVE
BASOPHILS # BLD AUTO: 0 10E3/UL (ref 0–0.2)
BASOPHILS NFR BLD AUTO: 0 %
BUN SERPL-MCNC: 16.8 MG/DL (ref 6–20)
CALCIUM SERPL-MCNC: 9.3 MG/DL (ref 8.6–10)
CHLORIDE SERPL-SCNC: 104 MMOL/L (ref 98–107)
CREAT SERPL-MCNC: 0.87 MG/DL (ref 0.51–0.95)
DEPRECATED HCO3 PLAS-SCNC: 23 MMOL/L (ref 22–29)
EGFRCR SERPLBLD CKD-EPI 2021: 90 ML/MIN/1.73M2
EOSINOPHIL # BLD AUTO: 0.1 10E3/UL (ref 0–0.7)
EOSINOPHIL NFR BLD AUTO: 1 %
ERYTHROCYTE [DISTWIDTH] IN BLOOD BY AUTOMATED COUNT: 12.7 % (ref 10–15)
GLUCOSE SERPL-MCNC: 89 MG/DL (ref 70–99)
HCG INTACT+B SERPL-ACNC: 93 MIU/ML
HCT VFR BLD AUTO: 34.9 % (ref 35–47)
HGB BLD-MCNC: 12.2 G/DL (ref 11.7–15.7)
IMM GRANULOCYTES # BLD: 0 10E3/UL
IMM GRANULOCYTES NFR BLD: 0 %
LYMPHOCYTES # BLD AUTO: 3.1 10E3/UL (ref 0.8–5.3)
LYMPHOCYTES NFR BLD AUTO: 45 %
MCH RBC QN AUTO: 30.1 PG (ref 26.5–33)
MCHC RBC AUTO-ENTMCNC: 35 G/DL (ref 31.5–36.5)
MCV RBC AUTO: 86 FL (ref 78–100)
MONOCYTES # BLD AUTO: 0.5 10E3/UL (ref 0–1.3)
MONOCYTES NFR BLD AUTO: 7 %
NEUTROPHILS # BLD AUTO: 3.2 10E3/UL (ref 1.6–8.3)
NEUTROPHILS NFR BLD AUTO: 47 %
NRBC # BLD AUTO: 0 10E3/UL
NRBC BLD AUTO-RTO: 0 /100
PLATELET # BLD AUTO: 200 10E3/UL (ref 150–450)
POTASSIUM SERPL-SCNC: 4.1 MMOL/L (ref 3.4–5.3)
RBC # BLD AUTO: 4.05 10E6/UL (ref 3.8–5.2)
SODIUM SERPL-SCNC: 137 MMOL/L (ref 135–145)
SPECIMEN EXPIRATION DATE: NORMAL
WBC # BLD AUTO: 6.8 10E3/UL (ref 4–11)

## 2024-02-24 PROCEDURE — 99284 EMERGENCY DEPT VISIT MOD MDM: CPT | Performed by: EMERGENCY MEDICINE

## 2024-02-24 PROCEDURE — 76801 OB US < 14 WKS SINGLE FETUS: CPT

## 2024-02-24 PROCEDURE — 86900 BLOOD TYPING SEROLOGIC ABO: CPT | Performed by: EMERGENCY MEDICINE

## 2024-02-24 PROCEDURE — 85025 COMPLETE CBC W/AUTO DIFF WBC: CPT | Performed by: EMERGENCY MEDICINE

## 2024-02-24 PROCEDURE — 99285 EMERGENCY DEPT VISIT HI MDM: CPT | Mod: 25 | Performed by: EMERGENCY MEDICINE

## 2024-02-24 PROCEDURE — 36415 COLL VENOUS BLD VENIPUNCTURE: CPT | Performed by: EMERGENCY MEDICINE

## 2024-02-24 PROCEDURE — 84702 CHORIONIC GONADOTROPIN TEST: CPT | Performed by: EMERGENCY MEDICINE

## 2024-02-24 PROCEDURE — 80048 BASIC METABOLIC PNL TOTAL CA: CPT | Performed by: EMERGENCY MEDICINE

## 2024-02-24 ASSESSMENT — COLUMBIA-SUICIDE SEVERITY RATING SCALE - C-SSRS
1. IN THE PAST MONTH, HAVE YOU WISHED YOU WERE DEAD OR WISHED YOU COULD GO TO SLEEP AND NOT WAKE UP?: NO
2. HAVE YOU ACTUALLY HAD ANY THOUGHTS OF KILLING YOURSELF IN THE PAST MONTH?: NO
6. HAVE YOU EVER DONE ANYTHING, STARTED TO DO ANYTHING, OR PREPARED TO DO ANYTHING TO END YOUR LIFE?: NO

## 2024-02-24 ASSESSMENT — ACTIVITIES OF DAILY LIVING (ADL)
ADLS_ACUITY_SCORE: 35
ADLS_ACUITY_SCORE: 33
ADLS_ACUITY_SCORE: 35

## 2024-02-25 NOTE — DISCHARGE INSTRUCTIONS
Return if symptoms worsen or new symptoms develop.  Follow-up with primary care physician next available Dr. Arias should call you on Tuesday to discuss your symptoms further.  If increased bleeding pain or other symptoms present please return for recheck.

## 2024-02-25 NOTE — ED PROVIDER NOTES
History     Chief Complaint   Patient presents with    Threatened Miscarriage     HPI  Mary Martinez is a 33 year old female  who presents the emergency department complaining of vaginal bleeding and cramping.  Patient states she believes she is about 7 weeks pregnant has a positive pregnancy test with a beta-hCG of 119 on 2/15/2024.  States she has been passing a small amount of blood over the past few days and none today.  Denies any fevers or chills has not any chest pain or shortness of breath.  She denies any calf pain she has not any focal numbness weakness any extremity bowel or bladder dysfunction.    Allergies:  No Known Allergies    Problem List:    Patient Active Problem List    Diagnosis Date Noted    IUD (intrauterine device) in place 2019     Priority: Medium    Breast feeding status of mother 2019     Priority: Medium    Routine postpartum follow-up 2016     Priority: Medium    CARDIOVASCULAR SCREENING; LDL GOAL LESS THAN 160 10/31/2010     Priority: Medium        Past Medical History:    Past Medical History:   Diagnosis Date    Chickenpox     Chlamydia        Past Surgical History:    Past Surgical History:   Procedure Laterality Date    NO HISTORY OF SURGERY         Family History:    Family History   Problem Relation Age of Onset    Lipids Father     Hypertension Maternal Grandmother     Heart Disease Maternal Grandmother     Hypertension Paternal Grandfather     Cerebrovascular Disease Paternal Grandfather     Connective Tissue Disorder Paternal Grandfather     Coronary Artery Disease Paternal Grandfather         MI    Hypertension Maternal Grandfather     Respiratory Maternal Grandfather         COPD    Coronary Artery Disease Maternal Grandfather         MI-stent    Hypertension Paternal Grandmother     Arthritis Paternal Grandmother     Eye Disorder Paternal Grandmother     Respiratory Paternal Grandmother         COPD    Depression Mother     Depression Maternal  "Aunt     Obesity Maternal Aunt     Genetic Disorder Brother         Menkes-  age 2    Breast Cancer No family hx of        Social History:  Marital Status:  Single [1]  Social History     Tobacco Use    Smoking status: Never     Passive exposure: Never    Smokeless tobacco: Never   Vaping Use    Vaping Use: Never used   Substance Use Topics    Alcohol use: Yes     Comment: occas- quit with pregnancy    Drug use: No        Medications:    citalopram (CELEXA) 20 MG tablet  MAGNESIUM PO  Menaquinone-7 (VITAMIN K2 PO)  Prenatal Vit-Fe Fumarate-FA (PRENATAL MULTIVITAMIN  PLUS IRON) 27-1 MG TABS  Probiotic Product (PROBIOTIC PO)  vitamin D3 (CHOLECALCIFEROL) 2000 units tablet          Review of Systems  As per HPI.  Physical Exam   BP: 114/75  Pulse: 72  Temp: 98.1  F (36.7  C)  Resp: 16  Height: 176.5 cm (5' 9.5\")  Weight: 75.2 kg (165 lb 12.6 oz)  SpO2: 100 %      Physical Exam  Vitals and nursing note reviewed.   Constitutional:       General: She is not in acute distress.     Appearance: Normal appearance. She is not ill-appearing, toxic-appearing or diaphoretic.   HENT:      Head: Normocephalic and atraumatic.      Nose: Nose normal.      Mouth/Throat:      Mouth: Mucous membranes are moist.      Pharynx: Oropharynx is clear.   Eyes:      Conjunctiva/sclera: Conjunctivae normal.   Cardiovascular:      Rate and Rhythm: Normal rate and regular rhythm.      Pulses: Normal pulses.      Heart sounds: Normal heart sounds. No murmur heard.  Pulmonary:      Effort: Pulmonary effort is normal.      Breath sounds: Normal breath sounds. No stridor. No wheezing or rhonchi.   Abdominal:      General: Abdomen is flat. Bowel sounds are normal. There is no distension.      Palpations: Abdomen is soft.      Tenderness: There is no abdominal tenderness. There is no right CVA tenderness or left CVA tenderness.   Musculoskeletal:         General: Normal range of motion.      Cervical back: Normal range of motion.      Right lower " leg: No edema.      Left lower leg: No edema.   Skin:     General: Skin is warm and dry.      Findings: No rash.   Neurological:      General: No focal deficit present.      Mental Status: She is alert and oriented to person, place, and time.      Sensory: No sensory deficit.      Motor: No weakness.      Coordination: Coordination normal.   Psychiatric:         Mood and Affect: Mood normal.         ED Course                 Procedures              Critical Care time:  none               Labs Ordered and Resulted from Time of ED Arrival to Time of ED Departure   HCG QUANTITATIVE PREGNANCY - Abnormal       Result Value    hCG Quantitative 93 (*)    CBC WITH PLATELETS AND DIFFERENTIAL - Abnormal    WBC Count 6.8      RBC Count 4.05      Hemoglobin 12.2      Hematocrit 34.9 (*)     MCV 86      MCH 30.1      MCHC 35.0      RDW 12.7      Platelet Count 200      % Neutrophils 47      % Lymphocytes 45      % Monocytes 7      % Eosinophils 1      % Basophils 0      % Immature Granulocytes 0      NRBCs per 100 WBC 0      Absolute Neutrophils 3.2      Absolute Lymphocytes 3.1      Absolute Monocytes 0.5      Absolute Eosinophils 0.1      Absolute Basophils 0.0      Absolute Immature Granulocytes 0.0      Absolute NRBCs 0.0     BASIC METABOLIC PANEL - Normal    Sodium 137      Potassium 4.1      Chloride 104      Carbon Dioxide (CO2) 23      Anion Gap 10      Urea Nitrogen 16.8      Creatinine 0.87      GFR Estimate 90      Calcium 9.3      Glucose 89     TYPE AND SCREEN, ADULT    ABO/RH(D) A NEG      Antibody Screen Negative      SPECIMEN EXPIRATION DATE [id number]     ABO/RH TYPE AND SCREEN          Medications   NONFORMULARY 50 mcg (50 mcg Intramuscular $Given 2/24/24 2220)     Results for orders placed or performed during the hospital encounter of 02/24/24   US OB <14 Weeks w Transvaginal Single    Narrative    EXAM: US OB <14 WEEKS WITH TRANSVAGINAL SINGLE  LOCATION: [facility]  DATE:  2/24/2024    INDICATION: Bleeding and cramping.  COMPARISON: 11/05/2023  TECHNIQUE: Transabdominal scans were performed. Endovaginal ultrasound was performed.    FINDINGS:  UTERUS: No normal intrauterine pregnancy.  ENDOMETRIUM: Thickened, heterogeneous and hypervascular with cystic and solid components measuring 2.7 cm.    RIGHT OVARY: Normal.  LEFT OVARY: Normal.    No adnexal mass. No free fluid in the pelvis.      Impression    IMPRESSION:   1.  No normal intrauterine pregnancy.  2.  Heterogeneous thickened cystic hypervascular endometrial stripe. The differential diagnosis includes miscarriage in progress, missed miscarriage with retained products of conception and molar pregnancy. Recommend close follow-up.            Assessments & Plan (with Medical Decision Making) records were reviewed including past medical history medications and allergies.  Office visit from 2/15/2024 was reviewed.  Along with previous miscarriage visit on 12/11/2023 was reviewed.  Labs were obtained.  I independently reviewed interpreted labs.  Patient CBC is unremarkable.  Basic metabolic without significant abnormality.  Beta-hCG is 93.  This is gone down from 9  days ago.  Due to patient's presentation and exam a pelvic ultrasound was obtained.  This revealed no normal intrauterine pregnancy.  There is heterogenous thickened cystic hypervascular endometrial stripe measuring 2.7 cm with differential including miscarriage in progress missed miscarriage with retained products conception molar pregnancy.  Due to these findings I discussed case with Dr. Arias with OB[facility]GYN at [facility].  With patient having minimal symptoms at this time she felt patient can be discharged with close follow-up.  She is going to talk to patient/visit with patient on Tuesday to decide plan.  Patient would like to be with Dr. Quezada and I sent the patient information to her.   patient was wondering about RhoGAM and Dr. Arias confirmed patient could have  this.  She was given 50 mcg dosage.  Patient stands that there is increased pain bleeding fevers decreased urine output or other symptoms present patient should return for recheck.  She feels comfortable with this plan at this time.     I have reviewed the nursing notes.    I have reviewed the findings, diagnosis, plan and need for follow up with the patient.           Discharge Medication List as of 2/24/2024 10:55 PM          Final diagnoses:   Incomplete miscarriage       2/24/2024   [facility] EMERGENCY DEPT       Earl Patel MD  02/26/24 8983

## 2024-02-25 NOTE — ED TRIAGE NOTES
"Pt presents with concerns for miscarriage. Pt has had 5 total pregnancies with 2 live births without problem, 2 miscarriages. Pt believes she is 7 weeks along. Noted cramping for last 3 days and began seeing \"stringy\" type bloody discharge yesterday on toilet paper. Has not had any heavy bleeding but very similar to previous pregnancies. Pt is Rh negative.      Triage Assessment (Adult)       Row Name 02/24/24 1179          Triage Assessment    Airway WDL WDL        Respiratory WDL    Respiratory WDL WDL        Skin Circulation/Temperature WDL    Skin Circulation/Temperature WDL WDL        Cardiac WDL    Cardiac WDL WDL        Peripheral/Neurovascular WDL    Peripheral Neurovascular WDL WDL        Cognitive/Neuro/Behavioral WDL    Cognitive/Neuro/Behavioral WDL WDL                     "

## 2024-02-27 ENCOUNTER — ANESTHESIA EVENT (OUTPATIENT)
Dept: SURGERY | Facility: CLINIC | Age: 34
End: 2024-02-27
Payer: COMMERCIAL

## 2024-02-27 ENCOUNTER — OFFICE VISIT (OUTPATIENT)
Dept: OBGYN | Facility: CLINIC | Age: 34
End: 2024-02-27
Payer: COMMERCIAL

## 2024-02-27 ENCOUNTER — TELEPHONE (OUTPATIENT)
Dept: OBGYN | Facility: CLINIC | Age: 34
End: 2024-02-27

## 2024-02-27 ENCOUNTER — PREP FOR PROCEDURE (OUTPATIENT)
Dept: OBGYN | Facility: CLINIC | Age: 34
End: 2024-02-27

## 2024-02-27 ENCOUNTER — APPOINTMENT (OUTPATIENT)
Dept: LAB | Facility: CLINIC | Age: 34
End: 2024-02-27
Payer: COMMERCIAL

## 2024-02-27 VITALS
HEIGHT: 70 IN | BODY MASS INDEX: 22.85 KG/M2 | WEIGHT: 159.6 LBS | DIASTOLIC BLOOD PRESSURE: 69 MMHG | TEMPERATURE: 98.4 F | SYSTOLIC BLOOD PRESSURE: 110 MMHG | HEART RATE: 68 BPM | RESPIRATION RATE: 16 BRPM

## 2024-02-27 DIAGNOSIS — O36.80X0 PREGNANCY OF UNKNOWN ANATOMIC LOCATION: Primary | ICD-10-CM

## 2024-02-27 DIAGNOSIS — O02.0 MOLAR PREGNANCY: Primary | ICD-10-CM

## 2024-02-27 LAB — HCG INTACT+B SERPL-ACNC: 91 MIU/ML

## 2024-02-27 PROCEDURE — 99213 OFFICE O/P EST LOW 20 MIN: CPT | Performed by: OBSTETRICS & GYNECOLOGY

## 2024-02-27 PROCEDURE — 36415 COLL VENOUS BLD VENIPUNCTURE: CPT | Performed by: OBSTETRICS & GYNECOLOGY

## 2024-02-27 PROCEDURE — 84702 CHORIONIC GONADOTROPIN TEST: CPT | Performed by: OBSTETRICS & GYNECOLOGY

## 2024-02-27 NOTE — ANESTHESIA PREPROCEDURE EVALUATION
Anesthesia Pre-Procedure Evaluation    Patient: Mary Martinez   MRN: [id number] : 1990        Procedure : Procedure(s):  DILATION AND CURETTAGE, UTERUS, USING SUCTION          Past Medical History:   Diagnosis Date    Chickenpox     Chlamydia       Past Surgical History:   Procedure Laterality Date    NO HISTORY OF SURGERY        No Known Allergies   Social History     Tobacco Use    Smoking status: Never     Passive exposure: Never    Smokeless tobacco: Never   Substance Use Topics    Alcohol use: Yes     Comment: occas- quit with pregnancy      Wt Readings from Last 1 Encounters:   24 72.4 kg (159 lb 9.6 oz)        Anesthesia Evaluation   Pt has not had prior anesthetic         ROS/MED HX  ENT/Pulmonary:  - neg pulmonary ROS     Neurologic:  - neg neurologic ROS     Cardiovascular:  - neg cardiovascular ROS     METS/Exercise Tolerance: >4 METS    Hematologic:  - neg hematologic  ROS     Musculoskeletal:  - neg musculoskeletal ROS     GI/Hepatic:  - neg GI/hepatic ROS     Renal/Genitourinary:  - neg Renal ROS     Endo:  - neg endo ROS     Psychiatric/Substance Use:  - neg psychiatric ROS     Infectious Disease:  - neg infectious disease ROS     Malignancy:  - neg malignancy ROS     Other:  - neg other ROS          Physical Exam    Airway  airway exam normal      Mallampati: II   TM distance: > 3 FB   Neck ROM: full   Mouth opening: > 3 cm    Respiratory Devices and Support         Dental       (+) Minor Abnormalities - some fillings, tiny chips      Cardiovascular   cardiovascular exam normal       Rhythm and rate: regular and normal     Pulmonary   pulmonary exam normal        breath sounds clear to auscultation           OUTSIDE LABS:  CBC:   Lab Results   Component Value Date    WBC 6.8 2024    WBC 8.0 2023    HGB 12.2 2024    HGB 12.6 2023    HCT 34.9 (L) 2024    HCT 36.4 2023     2024     2023     BMP:   Lab Results   Component Value  "Date     02/24/2024     11/05/2023    POTASSIUM 4.1 02/24/2024    POTASSIUM 4.1 11/05/2023    CHLORIDE 104 02/24/2024    CHLORIDE 100 11/05/2023    CO2 23 02/24/2024    CO2 24 11/05/2023    BUN 16.8 02/24/2024    BUN 13.0 11/05/2023    CR 0.87 02/24/2024    CR 0.74 11/05/2023    GLC 89 02/24/2024    GLC 96 11/05/2023     COAGS: No results found for: \"PTT\", \"INR\", \"FIBR\"  POC: No results found for: \"BGM\", \"HCG\", \"HCGS\"  HEPATIC:   Lab Results   Component Value Date    ALBUMIN 4.6 11/05/2023    PROTTOTAL 7.0 11/05/2023    ALT 37 11/05/2023    AST 30 11/05/2023    ALKPHOS 38 11/05/2023    BILITOTAL 0.2 11/05/2023     OTHER:   Lab Results   Component Value Date    EAN 9.3 02/24/2024    TSH 1.14 07/11/2012    T4 0.99 07/11/2012    SED 3 03/18/2014       Anesthesia Plan    ASA Status:  1    NPO Status:  NPO Appropriate    Anesthesia Type: General.     - Airway: Native airway   Induction: Intravenous, Propofol.   Maintenance: TIVA.        Consents    Anesthesia Plan(s) and associated risks, benefits, and realistic alternatives discussed. Questions answered and patient/representative(s) expressed understanding.     - Discussed: Risks, Benefits and Alternatives for BOTH SEDATION and the PROCEDURE were discussed     - Discussed with:  Patient      - Extended Intubation/Ventilatory Support Discussed: No.      - Patient is DNR/DNI Status: No     Use of blood products discussed: No .     Postoperative Care    Pain management: Oral pain medications, IV analgesics, Multi-modal analgesia.   PONV prophylaxis: Ondansetron (or other 5HT-3), Dexamethasone or Solumedrol, Background Propofol Infusion     Comments:               BRADY Hurt CRNA    I have reviewed the pertinent notes and labs in the chart from the past 30 days and (re)examined the patient.  Any updates or changes from those notes are reflected in this note.                  "

## 2024-02-27 NOTE — PROGRESS NOTES
[facility]  OB/GYN Clinic   Gynecology Consult Note/Pre-Op H&P    CC:     Chief Complaint   Patient presents with    Prenatal Care     Review HCG results       HPI: Ms. Martinez is a 33 year old  being seen for GYN consultation for vaginal bleeding in early pregnancy with pregnancy of unknown location.  She did present to an [facility] system for a confirmation of pregnancy when she got a positive pregnancy test at home.  She did have beta-hCG drawn at that visit with that which was noted to be 119.  After that visit she did have light vaginal bleeding in pregnancy and did present to the emergency department over the weekend.  Her pregnancy hormone was drawn at this visit was noted to decrease to 93.  Since her bleeding was very light and she had no abdominal pain she was recommended to follow-up in clinic with me today.  Since the weekend she reports that she continues to have mild cramping and then light bleeding since ED visit this weekend. Bleeding started Friday mid-day.  She denies the passage of any tissues or any grapelike clusters.  She does report that she had a miscarriage in November which was managed with misoprostol.  She reports that she did not take a pregnancy test at home to ensure that pregnancy had been resolved.   Last period before was kind of weird, to decided to have pregnancy hormone levels drawn.     She wonders about recurrent miscarriage since she had a biochemical pregnancy this summer 2023, miscarriage in 2023 and then vaginal bleeding today with a low beta-hCG and the highly likely to be an abnormal pregnancy.     ROS: A 10 pt ROS was completed and found to be otherwise negative unless mentioned in the HPI.     PMH:   Past Medical History:   Diagnosis Date    Chickenpox     Chlamydia        PSHx:   Past Surgical History:   Procedure Laterality Date    NO HISTORY OF SURGERY         OBHx:   OB History    Para Term  AB Living   4 2 2 0  1 2   SAB IAB Ectopic Multiple Live Births   1 0 0 0 2      # Outcome Date GA Lbr Karri/2nd Weight Sex Delivery Anes PTL Lv   4 Current            3 SAB 23           2 Term 19    M    MAG      Name: Benoit   1 Term 16 41w2d 05:35 / 02:45 3.912 kg (8 lb 10 oz) M Vag-Spont EPI  MAG      Name: Miguel      Apgar1: 8  Apgar5: 9   Miscarriages 2023 - +pregnancy test at home then menses a few days later   2023 - missed  at 6wks; did misoprostol to treat    Medications:   Prenatal Vit-Fe Fumarate-FA (PRENATAL MULTIVITAMIN  PLUS IRON) 27-1 MG TABS, Take by mouth daily  citalopram (CELEXA) 20 MG tablet, TAKE 1 TABLET (20 MG) BY MOUTH ONCE DAILY. (Patient not taking: Reported on 2024)  MAGNESIUM PO,   Menaquinone-7 (VITAMIN K2 PO),   Probiotic Product (PROBIOTIC PO),   vitamin D3 (CHOLECALCIFEROL) 2000 units tablet, Take 2 tabs daily (Patient not taking: Reported on 2024)    No current facility-administered medications on file prior to visit.      Allergies:    No Known Allergies    Social History:   Social History     Socioeconomic History    Marital status: Single     Spouse name: Not on file    Number of children: Not on file    Years of education: Not on file    Highest education level: Not on file   Occupational History    Not on file   Tobacco Use    Smoking status: Never     Passive exposure: Never    Smokeless tobacco: Never   Vaping Use    Vaping Use: Never used   Substance and Sexual Activity    Alcohol use: Yes     Comment: occas- quit with pregnancy    Drug use: No    Sexual activity: Yes     Partners: Male   Other Topics Concern    Parent/sibling w/ CABG, MI or angioplasty before 65F 55M? 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             Social Determinants of Health     Financial Resource Strain: Not on file   Food Insecurity: Not on file   Transportation Needs: Not on file   Physical Activity: Not on file   Stress: Not on  "file   Social Connections: Not on file   Interpersonal Safety: Not on file   Housing Stability: Not on file     Social History     Socioeconomic History    Marital status: Single     Spouse name: None    Number of children: None    Years of education: None    Highest education level: None   Tobacco Use    Smoking status: Never     Passive exposure: Never    Smokeless tobacco: Never   Vaping Use    Vaping Use: Never used   Substance and Sexual Activity    Alcohol use: Yes     Comment: occas- quit with pregnancy    Drug use: No    Sexual activity: Yes     Partners: Male   Other Topics Concern    Parent/sibling w/ CABG, MI or angioplasty before 65F 55M? No   Social History Narrative    Goes to [employer]    Studying - occupational therapy               Family History:   Family History   Problem Relation Age of Onset    Lipids Father     Hypertension Maternal Grandmother     Heart Disease Maternal Grandmother     Hypertension Paternal Grandfather     Cerebrovascular Disease Paternal Grandfather     Connective Tissue Disorder Paternal Grandfather     Coronary Artery Disease Paternal Grandfather         MI    Hypertension Maternal Grandfather     Respiratory Maternal Grandfather         COPD    Coronary Artery Disease Maternal Grandfather         MI-stent    Hypertension Paternal Grandmother     Arthritis Paternal Grandmother     Eye Disorder Paternal Grandmother     Respiratory Paternal Grandmother         COPD    Depression Mother     Depression Maternal Aunt     Obesity Maternal Aunt     Genetic Disorder Brother         Menkes-  age 2    Breast Cancer No family hx of        Physical Exam:   Vitals:    24 1047   BP: 110/69   BP Location: Right arm   Patient Position: Chair   Cuff Size: Adult Regular   Pulse: 68   Resp: 16   Temp: 98.4  F (36.9  C)   TempSrc: Tympanic   Weight: 72.4 kg (159 lb 9.6 oz)   Height: 1.765 m (5' 9.5\")      Estimated body mass index is 23.23 kg/m  as calculated from the following:    " "Height as of this encounter: 1.765 m (5' 9.5\").    Weight as of this encounter: 72.4 kg (159 lb 9.6 oz).    Gen: Pleasant, talkative female in no apparent distress   Respiratory: breathing comfortably on room air   Cardiac: Regular rate, warm and well-perfused.   Rectal: no masses or hemorrhoids visually appreciated  MSK: Grossly normal movement of all four extremities  Psych: mood and affect bright   Lower extremity: edema not present     Labs/Imaging:   EXAM: US OB <14 WEEKS WITH TRANSVAGINAL SINGLE  LOCATION: [facility]  DATE: 2024     INDICATION: Bleeding and cramping.  COMPARISON: 2023  TECHNIQUE: Transabdominal scans were performed. Endovaginal ultrasound was performed.     FINDINGS:  UTERUS: No normal intrauterine pregnancy.  ENDOMETRIUM: Thickened, heterogeneous and hypervascular with cystic and solid components measuring 2.7 cm.     RIGHT OVARY: Normal.  LEFT OVARY: Normal.     No adnexal mass. No free fluid in the pelvis.                                                                      IMPRESSION:   1.  No normal intrauterine pregnancy.  2.  Heterogeneous thickened cystic hypervascular endometrial stripe. The differential diagnosis includes miscarriage in progress, missed miscarriage with retained products of conception and molar pregnancy. Recommend close follow-up.    A&P: 33-year-old  who presents today at unknown gestational age with vaginal bleeding early pregnancy with pregnancy of unknown location.  I did have her obtain a repeat beta-hCG before the appointment today was noted to be 91 so certainly this is not a normal pregnancy.  We did review her ultrasound in detail together today which did show a thickened cystic hypervascular endometrial stripe.  We did discuss the differential diagnosis which I believe includes a molar pregnancy, partial molar pregnancy, early miscarriage or even less likely an ectopic pregnancy or retained products of " conception from her previous miscarriage.   Given the unusual appearance in her uterus and the need for definitive diagnosis I did recommend proceeding with a suction dilation and curettage as I do strongly believe tissue diagnosis is needed in this instance.  She did ask the possibility of treatment misoprostol which I do not think is a good idea given my suspicion for more of a partial molar pregnancy.  Review the steps of the procedure in detail as well as the risks of bleeding, infection and the rare risk of uterine injury.  She is Rh- and did receive RhoGAM in the emergency department.   She has no major medical problems and is medically cleared to go suction dilation and curettage.  Reviewed bleeding precautions precautions with her in detail and should present to the emergency department if she has bleeding soaking a pad in less than an hour.    Renetta Lezama MD  OB/GYN  2/27/2024

## 2024-02-27 NOTE — TELEPHONE ENCOUNTER
"[id number]  Mary Martinez    You are now scheduled for surgery at [facility].  Below are the details for your surgery.  Please read the \"Preparing for Your Surgery\" instructions and let us know if you have any questions.    Type of surgery: DILATION AND CURETTAGE, UTERUS, USING SUCTION   Surgeon:  Renetta Lezama MD  Location of surgery: [facility] OR    Date of surgery: 2/29/24    Time: 10:00am   Arrival Time: 9:00am    Time can change, to be confirmed a couple of days prior by pre-op surgery nurse.    Pre-Op Appt Date: AM of surgery  Post-Op Appt Date:    Time:     Packet sent out: Yes  Pre-cert/Authorization completed:  TBD by Financial Securing Office.   MA Sterilization/Hysterectomy Acknowledgment Consent signed: Not Applicable    [facility]  [phone]    Fax: [phone]  Same Day Surgery [phone]  Fax: [phone]  [facility] [phone]    "

## 2024-02-27 NOTE — TELEPHONE ENCOUNTER
S-(situation): left sided abdominal pain     B-(background): last office visit today with Dr. Lezama - pregnancy of unknown anatomic location     A-(assessment): patient calling to update Dr. Lezama that she is having abdominal pain on her left side straight across from her umbilicus near her hip. Patient reports pain feels like a dull ache, rates it a 2/10. Patient reports pain wraps a little to her front as well as her low back. Patient is not dizzy or lightheaded. Patient does not feel like she has nausea and is not vomiting. Patient denies difficulty breathing or shortness of breath. Patient reports the bleeding is about the same as when she was in the clinic earlier. Patient has not tried any Ibuprofen or Tylenol.     R-(recommendations): Dr. Lezama updated. She recommends trying Ibuprofen and Tylenol for dull ache. Recommends patient go to ER for further evaluation with increased pain that is a 6/10 on the pain scale. Patient to be evaluated in the ER with heavy bleeding, saturating more than a maxi pad per hour, passing clots larger than a golf ball, dizziness or lightheadedness, feeling weak, fatigued or faint.    Pt in agreement and reports understanding.    Brionna FELIPE   Ob/Gyn Clinic

## 2024-02-27 NOTE — PROGRESS NOTES
"Initial /69 (BP Location: Right arm, Patient Position: Chair, Cuff Size: Adult Regular)   Pulse 68   Temp 98.4  F (36.9  C) (Tympanic)   Resp 16   Ht 1.765 m (5' 9.5\")   Wt 72.4 kg (159 lb 9.6 oz)   LMP 01/08/2024 (Exact Date)   BMI 23.23 kg/m   Estimated body mass index is 23.23 kg/m  as calculated from the following:    Height as of this encounter: 1.765 m (5' 9.5\").    Weight as of this encounter: 72.4 kg (159 lb 9.6 oz). .  "

## 2024-02-29 ENCOUNTER — ANESTHESIA (OUTPATIENT)
Dept: SURGERY | Facility: CLINIC | Age: 34
End: 2024-02-29
Payer: COMMERCIAL

## 2024-02-29 ENCOUNTER — HOSPITAL ENCOUNTER (OUTPATIENT)
Facility: CLINIC | Age: 34
Discharge: HOME | End: 2024-02-29
Attending: OBSTETRICS & GYNECOLOGY | Admitting: OBSTETRICS & GYNECOLOGY
Payer: COMMERCIAL

## 2024-02-29 VITALS
BODY MASS INDEX: 22.76 KG/M2 | RESPIRATION RATE: 16 BRPM | OXYGEN SATURATION: 99 % | HEIGHT: 70 IN | HEART RATE: 72 BPM | SYSTOLIC BLOOD PRESSURE: 99 MMHG | TEMPERATURE: 98.2 F | DIASTOLIC BLOOD PRESSURE: 61 MMHG | WEIGHT: 159 LBS

## 2024-02-29 DIAGNOSIS — Z98.890 STATUS POST DILATION AND CURETTAGE: Primary | ICD-10-CM

## 2024-02-29 LAB
ABO/RH(D): ABNORMAL
ANTIBODY SCREEN: POSITIVE
BASOPHILS # BLD AUTO: 0 10E3/UL (ref 0–0.2)
BASOPHILS NFR BLD AUTO: 0 %
EOSINOPHIL # BLD AUTO: 0 10E3/UL (ref 0–0.7)
EOSINOPHIL NFR BLD AUTO: 0 %
ERYTHROCYTE [DISTWIDTH] IN BLOOD BY AUTOMATED COUNT: 12.7 % (ref 10–15)
HCG SERPL QL: POSITIVE
HCT VFR BLD AUTO: 40.6 % (ref 35–47)
HGB BLD-MCNC: 14.2 G/DL (ref 11.7–15.7)
HOLD SPECIMEN: NORMAL
IMM GRANULOCYTES # BLD: 0 10E3/UL
IMM GRANULOCYTES NFR BLD: 0 %
LYMPHOCYTES # BLD AUTO: 2 10E3/UL (ref 0–5.3)
LYMPHOCYTES NFR BLD AUTO: 39 %
MCH RBC QN AUTO: 30.3 PG (ref 26.5–33)
MCHC RBC AUTO-ENTMCNC: 35 G/DL (ref 31.5–36.5)
MCV RBC AUTO: 87 FL (ref 78–100)
MONOCYTES # BLD AUTO: 0.4 10E3/UL (ref 0–1.3)
MONOCYTES NFR BLD AUTO: 7 %
NEUTROPHILS # BLD AUTO: 2.7 10E3/UL (ref 1.6–8.3)
NEUTROPHILS NFR BLD AUTO: 54 %
NRBC # BLD AUTO: 0 10E3/UL
NRBC BLD AUTO-RTO: 0 /100
PLATELET # BLD AUTO: 200 10E3/UL (ref 150–450)
RBC # BLD AUTO: 4.69 10E6/UL (ref 3.8–5.2)
SPECIMEN EXPIRATION DATE: ABNORMAL
WBC # BLD AUTO: 5.1 10E3/UL (ref 4–11)

## 2024-02-29 PROCEDURE — 250N000011 HC RX IP 250 OP 636: Performed by: NURSE ANESTHETIST, CERTIFIED REGISTERED

## 2024-02-29 PROCEDURE — 710N000012 HC RECOVERY PHASE 2, PER MINUTE: Performed by: OBSTETRICS & GYNECOLOGY

## 2024-02-29 PROCEDURE — 84999 UNLISTED CHEMISTRY PROCEDURE: CPT | Performed by: OBSTETRICS & GYNECOLOGY

## 2024-02-29 PROCEDURE — 258N000003 HC RX IP 258 OP 636: Performed by: OBSTETRICS & GYNECOLOGY

## 2024-02-29 PROCEDURE — 86870 RBC ANTIBODY IDENTIFICATION: CPT | Performed by: OBSTETRICS & GYNECOLOGY

## 2024-02-29 PROCEDURE — 250N000009 HC RX 250: Performed by: NURSE ANESTHETIST, CERTIFIED REGISTERED

## 2024-02-29 PROCEDURE — 85025 COMPLETE CBC W/AUTO DIFF WBC: CPT | Performed by: OBSTETRICS & GYNECOLOGY

## 2024-02-29 PROCEDURE — 86880 COOMBS TEST DIRECT: CPT | Performed by: OBSTETRICS & GYNECOLOGY

## 2024-02-29 PROCEDURE — 88182 CELL MARKER STUDY: CPT | Performed by: OBSTETRICS & GYNECOLOGY

## 2024-02-29 PROCEDURE — 271N000001 HC OR GENERAL SUPPLY NON-STERILE: Performed by: OBSTETRICS & GYNECOLOGY

## 2024-02-29 PROCEDURE — 272N000001 HC OR GENERAL SUPPLY STERILE: Performed by: OBSTETRICS & GYNECOLOGY

## 2024-02-29 PROCEDURE — 250N000011 HC RX IP 250 OP 636: Performed by: OBSTETRICS & GYNECOLOGY

## 2024-02-29 PROCEDURE — 86900 BLOOD TYPING SEROLOGIC ABO: CPT | Performed by: OBSTETRICS & GYNECOLOGY

## 2024-02-29 PROCEDURE — 370N000017 HC ANESTHESIA TECHNICAL FEE, PER MIN: Performed by: OBSTETRICS & GYNECOLOGY

## 2024-02-29 PROCEDURE — 88305 TISSUE EXAM BY PATHOLOGIST: CPT | Mod: TC | Performed by: OBSTETRICS & GYNECOLOGY

## 2024-02-29 PROCEDURE — 250N000013 HC RX MED GY IP 250 OP 250 PS 637: Performed by: OBSTETRICS & GYNECOLOGY

## 2024-02-29 PROCEDURE — 360N000075 HC SURGERY LEVEL 2, PER MIN: Performed by: OBSTETRICS & GYNECOLOGY

## 2024-02-29 PROCEDURE — 86901 BLOOD TYPING SEROLOGIC RH(D): CPT | Performed by: OBSTETRICS & GYNECOLOGY

## 2024-02-29 PROCEDURE — 999N000141 HC STATISTIC PRE-PROCEDURE NURSING ASSESSMENT: Performed by: OBSTETRICS & GYNECOLOGY

## 2024-02-29 PROCEDURE — 250N000009 HC RX 250: Performed by: OBSTETRICS & GYNECOLOGY

## 2024-02-29 PROCEDURE — 250N000013 HC RX MED GY IP 250 OP 250 PS 637: Performed by: NURSE ANESTHETIST, CERTIFIED REGISTERED

## 2024-02-29 PROCEDURE — 258N000003 HC RX IP 258 OP 636: Performed by: NURSE ANESTHETIST, CERTIFIED REGISTERED

## 2024-02-29 PROCEDURE — 36415 COLL VENOUS BLD VENIPUNCTURE: CPT | Performed by: OBSTETRICS & GYNECOLOGY

## 2024-02-29 PROCEDURE — 84703 CHORIONIC GONADOTROPIN ASSAY: CPT | Performed by: OBSTETRICS & GYNECOLOGY

## 2024-02-29 PROCEDURE — 59812 TREATMENT OF MISCARRIAGE: CPT | Performed by: OBSTETRICS & GYNECOLOGY

## 2024-02-29 RX ORDER — KETAMINE HYDROCHLORIDE 10 MG/ML
INJECTION INTRAMUSCULAR; INTRAVENOUS PRN
Status: DISCONTINUED | OUTPATIENT
Start: 2024-02-29 | End: 2024-02-29

## 2024-02-29 RX ORDER — ACETAMINOPHEN 325 MG/1
975 TABLET ORAL ONCE
Status: CANCELLED | OUTPATIENT
Start: 2024-02-29 | End: 2024-02-29

## 2024-02-29 RX ORDER — IBUPROFEN 800 MG/1
800 TABLET, FILM COATED ORAL EVERY 6 HOURS PRN
Qty: 30 TABLET | Refills: 0 | Status: SHIPPED | OUTPATIENT
Start: 2024-02-29

## 2024-02-29 RX ORDER — SODIUM CHLORIDE, SODIUM LACTATE, POTASSIUM CHLORIDE, CALCIUM CHLORIDE 600; 310; 30; 20 MG/100ML; MG/100ML; MG/100ML; MG/100ML
INJECTION, SOLUTION INTRAVENOUS CONTINUOUS
Status: DISCONTINUED | OUTPATIENT
Start: 2024-02-29 | End: 2024-02-29 | Stop reason: HOSPADM

## 2024-02-29 RX ORDER — ONDANSETRON 2 MG/ML
INJECTION INTRAMUSCULAR; INTRAVENOUS PRN
Status: DISCONTINUED | OUTPATIENT
Start: 2024-02-29 | End: 2024-02-29

## 2024-02-29 RX ORDER — PROPOFOL 10 MG/ML
INJECTION, EMULSION INTRAVENOUS CONTINUOUS PRN
Status: DISCONTINUED | OUTPATIENT
Start: 2024-02-29 | End: 2024-02-29

## 2024-02-29 RX ORDER — OXYCODONE HYDROCHLORIDE 5 MG/1
5 TABLET ORAL
Status: DISCONTINUED | OUTPATIENT
Start: 2024-02-29 | End: 2024-02-29 | Stop reason: HOSPADM

## 2024-02-29 RX ORDER — OXYCODONE HYDROCHLORIDE 5 MG/1
5-10 TABLET ORAL EVERY 4 HOURS PRN
Qty: 6 TABLET | Refills: 0 | Status: SHIPPED | OUTPATIENT
Start: 2024-02-29 | End: 2024-06-17

## 2024-02-29 RX ORDER — DEXAMETHASONE SODIUM PHOSPHATE 4 MG/ML
INJECTION, SOLUTION INTRA-ARTICULAR; INTRALESIONAL; INTRAMUSCULAR; INTRAVENOUS; SOFT TISSUE PRN
Status: DISCONTINUED | OUTPATIENT
Start: 2024-02-29 | End: 2024-02-29

## 2024-02-29 RX ORDER — ACETAMINOPHEN 325 MG/1
975 TABLET ORAL ONCE
Status: COMPLETED | OUTPATIENT
Start: 2024-02-29 | End: 2024-02-29

## 2024-02-29 RX ORDER — GABAPENTIN 300 MG/1
300 CAPSULE ORAL
Status: COMPLETED | OUTPATIENT
Start: 2024-02-29 | End: 2024-02-29

## 2024-02-29 RX ORDER — LIDOCAINE 40 MG/G
CREAM TOPICAL
Status: DISCONTINUED | OUTPATIENT
Start: 2024-02-29 | End: 2024-02-29 | Stop reason: HOSPADM

## 2024-02-29 RX ORDER — IBUPROFEN 400 MG/1
800 TABLET, FILM COATED ORAL ONCE
Status: CANCELLED | OUTPATIENT
Start: 2024-02-29 | End: 2024-02-29

## 2024-02-29 RX ORDER — OXYCODONE HYDROCHLORIDE 5 MG/1
10 TABLET ORAL
Status: DISCONTINUED | OUTPATIENT
Start: 2024-02-29 | End: 2024-02-29 | Stop reason: HOSPADM

## 2024-02-29 RX ORDER — ACETAMINOPHEN 325 MG/1
975 TABLET ORAL ONCE
Status: DISCONTINUED | OUTPATIENT
Start: 2024-02-29 | End: 2024-02-29

## 2024-02-29 RX ORDER — ONDANSETRON 2 MG/ML
4 INJECTION INTRAMUSCULAR; INTRAVENOUS EVERY 30 MIN PRN
Status: DISCONTINUED | OUTPATIENT
Start: 2024-02-29 | End: 2024-02-29 | Stop reason: HOSPADM

## 2024-02-29 RX ORDER — NALOXONE HYDROCHLORIDE 0.4 MG/ML
0.1 INJECTION, SOLUTION INTRAMUSCULAR; INTRAVENOUS; SUBCUTANEOUS
Status: DISCONTINUED | OUTPATIENT
Start: 2024-02-29 | End: 2024-02-29 | Stop reason: HOSPADM

## 2024-02-29 RX ORDER — LIDOCAINE HYDROCHLORIDE 10 MG/ML
INJECTION, SOLUTION INFILTRATION; PERINEURAL PRN
Status: DISCONTINUED | OUTPATIENT
Start: 2024-02-29 | End: 2024-02-29 | Stop reason: HOSPADM

## 2024-02-29 RX ORDER — ONDANSETRON 4 MG/1
4 TABLET, ORALLY DISINTEGRATING ORAL EVERY 30 MIN PRN
Status: DISCONTINUED | OUTPATIENT
Start: 2024-02-29 | End: 2024-02-29 | Stop reason: HOSPADM

## 2024-02-29 RX ORDER — FENTANYL CITRATE 50 UG/ML
25 INJECTION, SOLUTION INTRAMUSCULAR; INTRAVENOUS
Status: DISCONTINUED | OUTPATIENT
Start: 2024-02-29 | End: 2024-02-29 | Stop reason: HOSPADM

## 2024-02-29 RX ORDER — OXYCODONE HYDROCHLORIDE 5 MG/1
5 TABLET ORAL
Status: CANCELLED | OUTPATIENT
Start: 2024-02-29

## 2024-02-29 RX ADMIN — PROPOFOL 100 MCG/KG/MIN: 10 INJECTION, EMULSION INTRAVENOUS at 10:10

## 2024-02-29 RX ADMIN — MIDAZOLAM 2 MG: 1 INJECTION INTRAMUSCULAR; INTRAVENOUS at 10:06

## 2024-02-29 RX ADMIN — LIDOCAINE HYDROCHLORIDE 0.1 ML: 10 INJECTION, SOLUTION EPIDURAL; INFILTRATION; INTRACAUDAL; PERINEURAL at 09:26

## 2024-02-29 RX ADMIN — DOXYCYCLINE 200 MG: 100 INJECTION, POWDER, LYOPHILIZED, FOR SOLUTION INTRAVENOUS at 09:27

## 2024-02-29 RX ADMIN — KETAMINE HYDROCHLORIDE 25 MG: 10 INJECTION INTRAMUSCULAR; INTRAVENOUS at 10:15

## 2024-02-29 RX ADMIN — DEXAMETHASONE SODIUM PHOSPHATE 4 MG: 4 INJECTION, SOLUTION INTRA-ARTICULAR; INTRALESIONAL; INTRAMUSCULAR; INTRAVENOUS; SOFT TISSUE at 10:10

## 2024-02-29 RX ADMIN — ONDANSETRON 4 MG: 2 INJECTION INTRAMUSCULAR; INTRAVENOUS at 10:10

## 2024-02-29 RX ADMIN — GABAPENTIN 300 MG: 300 CAPSULE ORAL at 09:15

## 2024-02-29 RX ADMIN — KETAMINE HYDROCHLORIDE 25 MG: 10 INJECTION INTRAMUSCULAR; INTRAVENOUS at 10:10

## 2024-02-29 RX ADMIN — ACETAMINOPHEN 975 MG: 325 TABLET, FILM COATED ORAL at 09:15

## 2024-02-29 RX ADMIN — SODIUM CHLORIDE, POTASSIUM CHLORIDE, SODIUM LACTATE AND CALCIUM CHLORIDE: 600; 310; 30; 20 INJECTION, SOLUTION INTRAVENOUS at 09:25

## 2024-02-29 ASSESSMENT — ACTIVITIES OF DAILY LIVING (ADL)
ADLS_ACUITY_SCORE: 29

## 2024-02-29 NOTE — DISCHARGE INSTRUCTIONS
Same Day Surgery Discharge Instructions  Special Precautions After Surgery - Adult    It is not unusual to feel lightheaded or faint, up to 24 hours after surgery or while taking pain medication.  If you have these symptoms; sit for a few minutes before standing and have someone assist you when getting up.  You should rest and relax for the next 24 hours and must have someone stay with you for at least 24 hours after your discharge.  DO NOT DRIVE any vehicle or operate mechanical equipment for 24 hours following the end of your surgery.  DO NOT DRIVE while taking narcotic pain medications that have been prescribed by your physician.  If you had a limb operated on, you must be able to use it fully to drive.  DO NOT drink alcoholic beverages for 24 hours following surgery or while taking prescription pain medication.  Drink clear liquids (apple juice, ginger ale, broth, 7-Up, etc.).  Progress to your regular diet as you feel able.  Any questions call your physician and do not make important decisions for 24 hours.    ACTIVITY  Rest today.  No activity or diet restrictions.  Resume activity as tolerated.      Medications:  Acetaminophen (Tylenol):  last dose: 9:15.  Ibuprofen (Motrin, Advil):  Next dose: as needed.  Follow the instructions on the bottle.    __________________________________________________________________________________________________________________________________  IMPORTANT NUMBERS:    [facility] [place] [facility] [place]:  [phone], [phone]  Pharmacy:  [phone]  Same Day Surgery:  [phone], Monday - Friday until 8:30 p.m.   OB Clinic:  [phone]   Nurse Advice Line: [phone]

## 2024-02-29 NOTE — OP NOTE
[facility] Gynecology Operative Note    Patient: Mary Martinez  : 1990  MRN: [id number]    Date of Service: 2024    Pre-operative diagnosis:  -Vaginal bleeding in early pregnancy, pregnancy of unknown location and concern for molar pregnancy    Post-operative diagnosis:  - Same  - retained products of conception (addended after pathology report)    Procedure:   -Suction dilation and curettage    Surgeon: Renetta Lezama MD  Assistants: none    Anesthesia: Local with MAC    EBL: 50 mL  Urine: Voided prior to the start of the case  Fluids: See anesthesia record    Specimens: Products of conception  Complications: none apparent     Findings: 6-week-sized midposition uterus with no appreciable ovarian or fallopian tube enlargement. External genitalia, vagina and cervix all within normal limits to gross examination.  Gritty texture noted throughout the uterus after removal products of conception.     Indications: Mary Martinez is a 33-year-old  who presented today at unknown gestational age with vaginal bleeding early pregnancy with pregnancy of unknown location.  I did have her obtain a repeat beta-hCG before the appointment today was noted to be 91 so certainly this is not a normal pregnancy.  We did review her ultrasound in detail together today which did show a thickened cystic hypervascular endometrial stripe.  We did discuss the differential diagnosis which I believe includes a molar pregnancy, partial molar pregnancy, early miscarriage or even less likely an ectopic pregnancy or retained products of conception from her previous miscarriage.   Given the unusual appearance in her uterus and the need for definitive diagnosis I did recommend proceeding with a suction dilation and curettage as I do strongly believe tissue diagnosis is needed in this instance. She did ask the possibility of treatment misoprostol which I do not think is a good idea given my suspicion for more  "of a partial molar pregnancy. Review the steps of the procedure in detail as well as the risks of bleeding, infection and the rare risk of uterine injury.  She is Rh- and did receive RhoGAM in the emergency department.   She has no major medical problems and is medically cleared to go suction dilation and curettage.    Procedure: The patient was taken to the operating room where she was placed in the dorsal lithotomy position. Sedation was administered until the patient was found to be comfortable. An exam under anesthesia was completed. The patient was then prepped and draped in the usual sterile fashion followed by a \"Time-Out\" to verify the correct patient and procedure. A speculum was inserted into the vagina and the cervix was visualized. 2cc of 1% lidocaine was injected into the anterior cervical lip and a single-toothed tenaculum was placed at 12 o'clock position. A paracervical block with 8 cc of 1% lidocaine was performed at 4 and 8 o'clock. The cervix was dilated using Hegar dilators to 7mm until a A sharp banjo curet was then used on all aspects of the uterine cavity in order to remove all calyces until a gritty texture was noted throughout.  This was repeated until minimal return of tissue was noted on the curet and a gritty texture was noted throughout. The tenaculum was removed from the cervix and good hemostasis was noted at the tenaculum puncture sites. The speculum was removed from the vagina. The patient tolerated the procedure well and was taken to the recovery room in stable condition.     Renetta Lezama MD  2/29/2024 10:47 AM    "

## 2024-02-29 NOTE — ANESTHESIA POSTPROCEDURE EVALUATION
Patient: Mary Martinez    Procedure: Procedure(s):  DILATION AND CURETTAGE, UTERUS, USING SUCTION       Anesthesia Type:  General    Note:  Disposition: Outpatient   Postop Pain Control: Uneventful            Sign Out: Well controlled pain   PONV: No   Neuro/Psych: Uneventful            Sign Out: Acceptable/Baseline neuro status   Airway/Respiratory: Uneventful            Sign Out: Acceptable/Baseline resp. status   CV/Hemodynamics: Uneventful            Sign Out: Acceptable CV status; No obvious hypovolemia; No obvious fluid overload   Other NRE: NONE   DID A NON-ROUTINE EVENT OCCUR? No           Last vitals:  Vitals Value Taken Time   /73 02/29/24 1100   Temp 36.8  C (98.2  F) 02/29/24 1038   Pulse 69 02/29/24 1100   Resp 16 02/29/24 1038   SpO2 100 % 02/29/24 1114   Vitals shown include unfiled device data.    Electronically Signed By: eFr Lama CRNA, APRN CRNA  February 29, 2024  11:14 AM

## 2024-02-29 NOTE — ANESTHESIA CARE TRANSFER NOTE
Patient: Mary Martinez    Procedure: Procedure(s):  DILATION AND CURETTAGE, UTERUS, USING SUCTION       Diagnosis: Molar pregnancy [O02.0]  Diagnosis Additional Information: No value filed.    Anesthesia Type:   General     Note:    Oropharynx: oropharynx clear of all foreign objects and spontaneously breathing  Level of Consciousness: awake  Oxygen Supplementation: room air    Independent Airway: airway patency satisfactory and stable  Dentition: dentition unchanged  Vital Signs Stable: post-procedure vital signs reviewed and stable  Report to RN Given: handoff report given  Patient transferred to: Phase II    Handoff Report: Identifed the Patient, Identified the Reponsible Provider, Reviewed the pertinent medical history, Discussed the surgical course, Reviewed Intra-OP anesthesia mangement and issues during anesthesia, Set expectations for post-procedure period and Allowed opportunity for questions and acknowledgement of understanding      Vitals:  Vitals Value Taken Time   /71 02/29/24 1038   Temp 36.8  C (98.2  F) 02/29/24 1038   Pulse 75 02/29/24 1038   Resp 16 02/29/24 1038   SpO2 99 % 02/29/24 1039   Vitals shown include unfiled device data.    Electronically Signed By: Fer Lama CRNA, APRN CRNA  February 29, 2024  10:40 AM

## 2024-03-04 LAB
Lab: NORMAL
PERFORMING LABORATORY: NORMAL
SCANNED LAB RESULT: NORMAL
TEST NAME: NORMAL

## 2024-03-05 PROCEDURE — 88342 IMHCHEM/IMCYTCHM 1ST ANTB: CPT | Mod: 26 | Performed by: PATHOLOGY

## 2024-03-05 PROCEDURE — 88305 TISSUE EXAM BY PATHOLOGIST: CPT | Mod: 26 | Performed by: PATHOLOGY

## 2024-03-13 LAB
DNA INDEX SPEC FC-ACNC: 1
DNA PLOIDY SPEC FC: NORMAL
PATHOLOGY STUDY: NORMAL

## 2024-03-29 LAB
PATH REPORT.ADDENDUM SPEC: NORMAL
PATH REPORT.COMMENTS IMP SPEC: NORMAL
PATH REPORT.FINAL DX SPEC: NORMAL
PATH REPORT.GROSS SPEC: NORMAL
PATH REPORT.MICROSCOPIC SPEC OTHER STN: NORMAL
PATH REPORT.MICROSCOPIC SPEC OTHER STN: NORMAL
PATH REPORT.RELEVANT HX SPEC: NORMAL
PHOTO IMAGE: NORMAL

## 2024-06-17 ENCOUNTER — OFFICE VISIT (OUTPATIENT)
Dept: OBGYN | Facility: CLINIC | Age: 34
End: 2024-06-17
Payer: COMMERCIAL

## 2024-06-17 VITALS
TEMPERATURE: 98.6 F | WEIGHT: 158.6 LBS | HEART RATE: 90 BPM | BODY MASS INDEX: 22.71 KG/M2 | HEIGHT: 70 IN | DIASTOLIC BLOOD PRESSURE: 70 MMHG | SYSTOLIC BLOOD PRESSURE: 110 MMHG | OXYGEN SATURATION: 98 %

## 2024-06-17 DIAGNOSIS — N93.9 ABNORMAL UTERINE BLEEDING (AUB): Primary | ICD-10-CM

## 2024-06-17 LAB
HCG INTACT+B SERPL-ACNC: <1 MIU/ML
TSH SERPL DL<=0.005 MIU/L-ACNC: 0.83 UIU/ML (ref 0.3–4.2)

## 2024-06-17 PROCEDURE — 36415 COLL VENOUS BLD VENIPUNCTURE: CPT | Performed by: OBSTETRICS & GYNECOLOGY

## 2024-06-17 PROCEDURE — 99214 OFFICE O/P EST MOD 30 MIN: CPT | Performed by: OBSTETRICS & GYNECOLOGY

## 2024-06-17 PROCEDURE — 84443 ASSAY THYROID STIM HORMONE: CPT | Performed by: OBSTETRICS & GYNECOLOGY

## 2024-06-17 PROCEDURE — 84702 CHORIONIC GONADOTROPIN TEST: CPT | Performed by: OBSTETRICS & GYNECOLOGY

## 2024-06-17 RX ORDER — ESCITALOPRAM OXALATE 10 MG/1
10 TABLET ORAL DAILY
COMMUNITY
Start: 2024-05-30

## 2024-06-17 NOTE — PROGRESS NOTES
Chief Complaint   Patient presents with    Consult     Fertility- feels off since D&C, cycle changes         HPI:  Mary Martinez, 34 year old,  presents with complaints of concerns since her last miscarriage.  She reports that she had 3 miscarriages since August.  She had a D&C in February because there was concerns for a possible molar pregnancy on ultrasound.  The pathology showed necrotic tissue and no molar pregnancy.  She is not sure if the there were some retained products from her previous pregnancy.  She has had 3 cycles since February.  The first 2 were heavy and lasted for about 5 days maybe a little longer.  Last period was in May and it lasted about 3 days and was lighter.  It did start with what looks like little pieces of tissue and some stringy consistency that looked similar to when she had her miscarriage.  She is worried that there is something still in the uterus.  She is due for her menstrual cycle in about 2 days.      Past Medical History:   Diagnosis Date    Chickenpox     Chlamydia      Past Surgical History:   Procedure Laterality Date    DILATION AND CURETTAGE SUCTION N/A 2024    Procedure: DILATION AND CURETTAGE, UTERUS, USING SUCTION;  Surgeon: Renetta Lezama MD;  Location: [facility] OR    NO HISTORY OF SURGERY       Social History     Socioeconomic History    Marital status: Single     Spouse name: Not on file    Number of children: Not on file    Years of education: Not on file    Highest education level: Not on file   Occupational History    Not on file   Tobacco Use    Smoking status: Never     Passive exposure: Never    Smokeless tobacco: Never   Vaping Use    Vaping status: Never Used   Substance and Sexual Activity    Alcohol use: Yes     Comment: occas    Drug use: No    Sexual activity: Yes     Partners: Male     Birth control/protection: None   Other Topics Concern    Parent/sibling w/ CABG, MI or angioplasty before 65F 55M? 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             Social Determinants of Health     Financial Resource Strain: Low Risk  (5/30/2024)    Received from [facility]    Financial Resource Strain     Difficulty of Paying Living Expenses: 3     Difficulty of Paying Living Expenses: Not on file   Food Insecurity: No Food Insecurity (5/30/2024)    Received from [facility]    Food Insecurity     Worried About Running Out of Food in the Last Year: 1   Transportation Needs: No Transportation Needs (5/30/2024)    Received from [facility]    Transportation Needs     Lack of Transportation (Medical): 1   Physical Activity: Not on file   Stress: Not on file   Social Connections: Socially Integrated (5/30/2024)    Received from [facility]    Social Connections     Frequency of Communication with Friends and Family: 0   Interpersonal Safety: Not on file   Housing Stability: Low Risk  (5/30/2024)    Received from [facility]    Housing Stability     Unable to Pay for Housing in the Last Year: 1     Family History   Problem Relation Age of Onset    Lipids Father     Hypertension Maternal Grandmother     Heart Disease Maternal Grandmother     Hypertension Paternal Grandfather     Cerebrovascular Disease Paternal Grandfather     Connective Tissue Disorder Paternal Grandfather     Coronary Artery Disease Paternal Grandfather         MI    Hypertension Maternal Grandfather     Respiratory Maternal Grandfather         COPD    Coronary Artery Disease Maternal Grandfather         MI-stent    Hypertension Paternal Grandmother     Arthritis Paternal Grandmother     Eye Disorder Paternal Grandmother     Respiratory Paternal Grandmother         COPD    Depression Mother     Depression Maternal Aunt     Obesity Maternal Aunt     Genetic Disorder Brother          "Menkes-  age 2    Breast Cancer No family hx of         Current Outpatient Medications   Medication Sig Dispense Refill    escitalopram (LEXAPRO) 10 MG tablet Take 10 mg by mouth daily      Prenatal Vit-Fe Fumarate-FA (PRENATAL MULTIVITAMIN  PLUS IRON) 27-1 MG TABS Take by mouth daily      Probiotic Product (PROBIOTIC PO)       vitamin D3 (CHOLECALCIFEROL) 2000 units tablet       ibuprofen (ADVIL/MOTRIN) 800 MG tablet Take 1 tablet (800 mg) by mouth every 6 hours as needed for other (mild and/or inflammatory pain) (Patient not taking: Reported on 2024) 30 tablet 0        Allergies:   No Known Allergies     ROS:  See HPI      Physical Exam:  /70 (BP Location: Right arm, Patient Position: Chair, Cuff Size: Adult Regular)   Pulse 90   Temp 98.6  F (37  C) (Tympanic)   Ht 1.765 m (5' 9.5\")   Wt 71.9 kg (158 lb 9.6 oz)   LMP 2024 (Exact Date)   SpO2 98%   Breastfeeding Unknown   BMI 23.09 kg/m       Appearance: well developed, well nourished, no acute distress  Head Exam normocephalic, no lesions or deformities  Psych: orientated x3    Assessment/Plan:  Encounter Diagnosis   Name Primary?    Abnormal uterine bleeding (AUB) Yes       It is unclear if she has had 3 miscarriages.  There was some concern that may be the tissue found at D&C was due to a previous miscarriage making it total of 2 miscarriages.  We discussed testing for recurrent miscarriages including parental karyotypes which I did not recommend at this time.  Blood testing for antiphospholipid antibodies and thyroid.  She declined testing at this time with the exception of the thyroid screen.  We will also get an hCG to confirm that it is negative.  We discussed ultrasound after her next menses which is due in about 2 days.    At this point she is mostly wanting to confirm that the uterus is clear of any products of conception.  If her ultrasound is abnormal we discussed hysteroscopy for evaluation.          Germania Contreras, " MD on 6/17/2024 at 9:21 AM

## 2024-06-17 NOTE — NURSING NOTE
"Initial /70 (BP Location: Right arm, Patient Position: Chair, Cuff Size: Adult Regular)   Pulse 90   Temp 98.6  F (37  C) (Tympanic)   Ht 1.765 m (5' 9.5\")   Wt 71.9 kg (158 lb 9.6 oz)   LMP 05/22/2024 (Exact Date)   SpO2 98%   Breastfeeding Unknown   BMI 23.09 kg/m   Estimated body mass index is 23.09 kg/m  as calculated from the following:    Height as of this encounter: 1.765 m (5' 9.5\").    Weight as of this encounter: 71.9 kg (158 lb 9.6 oz). .    Yudelka Roberts, [facility]    "

## 2024-06-17 NOTE — RESULT ENCOUNTER NOTE
Patient needs scheduled for her post op appointment from sx on 3/11. No soon appointments available. Please follow up with Chichi Sissy to get patient scheduled. She stated that if she doesn't answer go ahead and schedule the patient and just leave the appointment in the voicemail. The attached results were normal. Please follow any recommendations discussed in clinic.    Germania Contreras MD          6/17/2024 4:23 PM

## 2024-06-24 ENCOUNTER — HOSPITAL ENCOUNTER (OUTPATIENT)
Dept: ULTRASOUND IMAGING | Facility: CLINIC | Age: 34
Discharge: HOME | End: 2024-06-24
Attending: OBSTETRICS & GYNECOLOGY | Admitting: OBSTETRICS & GYNECOLOGY
Payer: COMMERCIAL

## 2024-06-24 DIAGNOSIS — N93.9 ABNORMAL UTERINE BLEEDING (AUB): ICD-10-CM

## 2024-06-24 PROCEDURE — 76856 US EXAM PELVIC COMPLETE: CPT

## 2024-09-16 ENCOUNTER — LAB REQUISITION (OUTPATIENT)
Dept: LAB | Facility: CLINIC | Age: 34
End: 2024-09-16
Payer: COMMERCIAL

## 2024-09-16 DIAGNOSIS — Z36.89 ENCOUNTER FOR OTHER SPECIFIED ANTENATAL SCREENING: ICD-10-CM

## 2024-09-16 PROCEDURE — 87086 URINE CULTURE/COLONY COUNT: CPT | Mod: ORL | Performed by: NURSE PRACTITIONER

## 2024-09-18 LAB — BACTERIA UR CULT: NORMAL

## 2024-10-11 ENCOUNTER — LAB REQUISITION (OUTPATIENT)
Dept: LAB | Facility: CLINIC | Age: 34
End: 2024-10-11
Payer: COMMERCIAL

## 2024-10-11 DIAGNOSIS — Z34.90 ENCOUNTER FOR SUPERVISION OF NORMAL PREGNANCY, UNSPECIFIED, UNSPECIFIED TRIMESTER: ICD-10-CM

## 2024-10-11 DIAGNOSIS — Z11.3 ENCOUNTER FOR SCREENING FOR INFECTIONS WITH A PREDOMINANTLY SEXUAL MODE OF TRANSMISSION: ICD-10-CM

## 2024-10-11 LAB
BASOPHILS # BLD AUTO: 0 10E3/UL (ref 0–0.2)
BASOPHILS NFR BLD AUTO: 0 %
EOSINOPHIL # BLD AUTO: 0 10E3/UL (ref 0–0.7)
EOSINOPHIL NFR BLD AUTO: 0 %
ERYTHROCYTE [DISTWIDTH] IN BLOOD BY AUTOMATED COUNT: 13 % (ref 10–15)
EST. AVERAGE GLUCOSE BLD GHB EST-MCNC: 100 MG/DL
HBA1C MFR BLD: 5.1 %
HCT VFR BLD AUTO: 38.8 % (ref 35–47)
HGB BLD-MCNC: 12.9 G/DL (ref 11.7–15.7)
HIV 1+2 AB+HIV1 P24 AG SERPL QL IA: NONREACTIVE
IMM GRANULOCYTES # BLD: 0.1 10E3/UL
IMM GRANULOCYTES NFR BLD: 1 %
LYMPHOCYTES # BLD AUTO: 2.2 10E3/UL (ref 0.8–5.3)
LYMPHOCYTES NFR BLD AUTO: 22 %
MCH RBC QN AUTO: 29.9 PG (ref 26.5–33)
MCHC RBC AUTO-ENTMCNC: 33.2 G/DL (ref 31.5–36.5)
MCV RBC AUTO: 90 FL (ref 78–100)
MONOCYTES # BLD AUTO: 0.6 10E3/UL (ref 0–1.3)
MONOCYTES NFR BLD AUTO: 6 %
NEUTROPHILS # BLD AUTO: 7.2 10E3/UL (ref 1.6–8.3)
NEUTROPHILS NFR BLD AUTO: 71 %
NRBC # BLD AUTO: 0 10E3/UL
NRBC BLD AUTO-RTO: 0 /100
PLATELET # BLD AUTO: 293 10E3/UL (ref 150–450)
RBC # BLD AUTO: 4.31 10E6/UL (ref 3.8–5.2)
WBC # BLD AUTO: 10.1 10E3/UL (ref 4–11)

## 2024-10-11 PROCEDURE — 80081 OBSTETRIC PANEL INC HIV TSTG: CPT | Mod: ORL | Performed by: OBSTETRICS & GYNECOLOGY

## 2024-10-11 PROCEDURE — 83036 HEMOGLOBIN GLYCOSYLATED A1C: CPT | Mod: ORL | Performed by: OBSTETRICS & GYNECOLOGY

## 2024-10-11 PROCEDURE — 87491 CHLMYD TRACH DNA AMP PROBE: CPT | Mod: ORL | Performed by: OBSTETRICS & GYNECOLOGY

## 2024-10-11 PROCEDURE — 86803 HEPATITIS C AB TEST: CPT | Mod: ORL | Performed by: OBSTETRICS & GYNECOLOGY

## 2024-10-12 LAB
ABO/RH(D): NORMAL
ANTIBODY SCREEN: NEGATIVE
C TRACH DNA SPEC QL PROBE+SIG AMP: NEGATIVE
HBV SURFACE AG SERPL QL IA: NONREACTIVE
HCV AB SERPL QL IA: NONREACTIVE
N GONORRHOEA DNA SPEC QL NAA+PROBE: NEGATIVE
SPECIMEN EXPIRATION DATE: NORMAL

## 2024-10-14 LAB
RPR SER QL: NONREACTIVE
RUBV IGG SERPL QL IA: 21.9 INDEX
RUBV IGG SERPL QL IA: POSITIVE

## 2024-11-05 ENCOUNTER — LAB REQUISITION (OUTPATIENT)
Dept: LAB | Facility: CLINIC | Age: 34
End: 2024-11-05
Payer: COMMERCIAL

## 2024-11-05 DIAGNOSIS — Z36.1 ENCOUNTER FOR ANTENATAL SCREENING FOR RAISED ALPHAFETOPROTEIN LEVEL: ICD-10-CM

## 2024-11-08 LAB
# FETUSES US: NORMAL
AFP MOM SERPL: 1.18
AFP SERPL-MCNC: 36 NG/ML
AGE - REPORTED: 35 YR
CURRENT SMOKER: NO
FAMILY MEMBER DISEASES HX: NO
GA METHOD: NORMAL
GA: NORMAL WK
IDDM PATIENT QL: NO
INTEGRATED SCN PATIENT-IMP: NORMAL
SPECIMEN DRAWN SERPL: NORMAL

## 2025-01-27 ENCOUNTER — LAB REQUISITION (OUTPATIENT)
Dept: LAB | Facility: CLINIC | Age: 35
End: 2025-01-27
Payer: COMMERCIAL

## 2025-01-27 DIAGNOSIS — Z34.80 ENCOUNTER FOR SUPERVISION OF OTHER NORMAL PREGNANCY, UNSPECIFIED TRIMESTER: ICD-10-CM

## 2025-01-27 DIAGNOSIS — Z01.83 ENCOUNTER FOR BLOOD TYPING: ICD-10-CM

## 2025-01-27 DIAGNOSIS — Z11.3 ENCOUNTER FOR SCREENING FOR INFECTIONS WITH A PREDOMINANTLY SEXUAL MODE OF TRANSMISSION: ICD-10-CM

## 2025-01-27 DIAGNOSIS — Z36.89 ENCOUNTER FOR OTHER SPECIFIED ANTENATAL SCREENING: ICD-10-CM

## 2025-01-27 LAB
BLD GP AB SCN SERPL QL: NEGATIVE
ERYTHROCYTE [DISTWIDTH] IN BLOOD BY AUTOMATED COUNT: 13.5 % (ref 10–15)
HCT VFR BLD AUTO: 37 % (ref 35–47)
HGB BLD-MCNC: 12 G/DL (ref 11.7–15.7)
MCH RBC QN AUTO: 29.3 PG (ref 26.5–33)
MCHC RBC AUTO-ENTMCNC: 32.4 G/DL (ref 31.5–36.5)
MCV RBC AUTO: 90 FL (ref 78–100)
PLATELET # BLD AUTO: 251 10E3/UL (ref 150–450)
RBC # BLD AUTO: 4.1 10E6/UL (ref 3.8–5.2)
SPECIMEN EXP DATE BLD: NORMAL
WBC # BLD AUTO: 11.9 10E3/UL (ref 4–11)

## 2025-01-27 PROCEDURE — 86850 RBC ANTIBODY SCREEN: CPT | Mod: ORL | Performed by: OBSTETRICS & GYNECOLOGY

## 2025-01-27 PROCEDURE — 86780 TREPONEMA PALLIDUM: CPT | Mod: ORL | Performed by: OBSTETRICS & GYNECOLOGY

## 2025-01-27 PROCEDURE — 85027 COMPLETE CBC AUTOMATED: CPT | Mod: ORL | Performed by: OBSTETRICS & GYNECOLOGY

## 2025-01-28 LAB — T PALLIDUM AB SER QL: NONREACTIVE

## 2025-03-14 ENCOUNTER — APPOINTMENT (OUTPATIENT)
Dept: ULTRASOUND IMAGING | Facility: CLINIC | Age: 35
End: 2025-03-14
Attending: STUDENT IN AN ORGANIZED HEALTH CARE EDUCATION/TRAINING PROGRAM
Payer: COMMERCIAL

## 2025-03-14 ENCOUNTER — HOSPITAL ENCOUNTER (OUTPATIENT)
Facility: CLINIC | Age: 35
Discharge: HOME | End: 2025-03-14
Attending: OBSTETRICS & GYNECOLOGY | Admitting: RADIOLOGY
Payer: COMMERCIAL

## 2025-03-14 VITALS — TEMPERATURE: 97.9 F | DIASTOLIC BLOOD PRESSURE: 65 MMHG | SYSTOLIC BLOOD PRESSURE: 109 MMHG | RESPIRATION RATE: 16 BRPM

## 2025-03-14 PROBLEM — Z36.89 ENCOUNTER FOR TRIAGE IN PREGNANT PATIENT: Status: ACTIVE | Noted: 2025-03-14

## 2025-03-14 PROCEDURE — 76819 FETAL BIOPHYS PROFIL W/O NST: CPT

## 2025-03-14 PROCEDURE — G0463 HOSPITAL OUTPT CLINIC VISIT: HCPCS

## 2025-03-14 RX ORDER — CITALOPRAM HYDROBROMIDE 10 MG/1
0.5 TABLET ORAL DAILY
COMMUNITY

## 2025-03-14 RX ORDER — LIDOCAINE 40 MG/G
CREAM TOPICAL
Status: DISCONTINUED | OUTPATIENT
Start: 2025-03-14 | End: 2025-03-14 | Stop reason: HOSPADM

## 2025-03-14 ASSESSMENT — ACTIVITIES OF DAILY LIVING (ADL)
ADLS_ACUITY_SCORE: 41
ADLS_ACUITY_SCORE: 41

## 2025-03-14 NOTE — DISCHARGE INSTRUCTIONS
Learning About When to Call Your Doctor During Pregnancy (After 20 Weeks)  Overview  It's common to have concerns about what might be a problem when you're pregnant. Most pregnancies don't have any serious problems. But it's still important to know when to call your doctor if you have certain symptoms or signs of labor.  These are general suggestions. Your doctor may give you some more information about when to call.  When to call your doctor (after 20 weeks)  Call 911  anytime you think you may need emergency care. For example, call if:  You have severe vaginal bleeding. This means you are soaking through a pad each hour for 2 or more hours.  You have sudden, severe pain in your belly.  You have chest pain, are short of breath, or cough up blood.  You passed out (lost consciousness).  You have a seizure.  You see or feel the umbilical cord.  You think you are about to deliver your baby and can't make it safely to the hospital or birthing center.  Call your doctor now or seek immediate medical care if:  You have vaginal bleeding.  You have belly pain.  You have a fever.  You are dizzy or lightheaded, or you feel like you may faint.  You have signs of a blood clot in your leg (called a deep vein thrombosis), such as:  Pain in the calf, back of the knee, thigh, or groin.  Swelling in your leg or groin.  A color change on the leg or groin. The skin may be reddish or purplish, depending on your usual skin color.  You have symptoms of preeclampsia, such as:  Sudden swelling of your face, hands, or feet.  New vision problems (such as dimness, blurring, or seeing spots).  A severe headache.  You have a sudden release of fluid from your vagina. (You think your water broke.)  You've been having regular contractions for an hour. This means that you've had at least 6 contractions within 1 hour, even after you change your position and drink fluids.  You notice that your baby has stopped moving or is moving less than  "normal.  You have signs of heart failure, such as:  New or increased shortness of breath.  New or worse swelling in your legs, ankles, or feet.  Sudden weight gain, such as more than 2 to 3 pounds in a day or 5 pounds in a week.  Feeling so tired or weak that you cannot do your usual activities.  You have symptoms of a urinary tract infection. These may include:  Pain or burning when you urinate.  A frequent need to urinate without being able to pass much urine.  Pain in the flank, which is just below the rib cage and above the waist on either side of the back.  Blood in your urine.  Watch closely for changes in your health, and be sure to contact your doctor if:  You have vaginal discharge that smells bad.  You feel sad, anxious, or hopeless for more than a few days.  You have skin changes, such as a rash, itching, or a yellow color to your skin.  You have other concerns about your pregnancy.  If you have labor signs at 37 weeks or more  If you have signs of labor at 37 weeks or more, your doctor may tell you to call when your labor becomes more active. Symptoms of active labor include:  Contractions that are regular.  Contractions that are less than 5 minutes apart.  Contractions that are hard to talk through.  Follow-up care is a key part of your treatment and safety. Be sure to make and go to all appointments, and call your doctor if you are having problems. It's also a good idea to know your test results and keep a list of the medicines you take.  Where can you learn more?  Go to https://www.[employer].net/patiented  Enter N531 in the search box to learn more about \"Learning About When to Call Your Doctor During Pregnancy (After 20 Weeks).\"  Current as of: April 30, 2024  Content Version: 14.4    3021-5589 [facility] "[employer]".   Care instructions adapted under license by your healthcare professional. If you have questions about a medical condition or this instruction, always ask your healthcare professional. " [employer], [facility] disclaims any warranty or liability for your use of this information.

## 2025-03-14 NOTE — PROGRESS NOTES
Data: Patient presented to [facility]: 3/14/2025  9:33 AM.  Reason for maternal/fetal assessment is decreased fetal movement. Patient reports decrease fetal movement. Patient denies uterine contractions, leaking of vaginal fluid/rupture of membranes, vaginal bleeding, abdominal pain, pelvic pressure, nausea, vomiting, headache, visual disturbances, epigastric or RUQ pain, significant edema. Patient reports fetal movement is normal since driving over to hospital but has been decrease in the last few days.. Patient is a 34w1d .  Prenatal record reviewed. Pregnancy has been uncomplicated.    Vital signs wnl. Support person is not present.     Action: Verbal consent for EFM. Triage assessment completed.     Response: Patient verbalized agreement with plan. Will contact Dr Franco with update and further orders.

## 2025-03-14 NOTE — PROCEDURES
NST Procedure note    Date: 3/14/2025    Indication: Decreased fetal movement    Procedure: Fetal non-stress test    Results: Reactive    Allyson Franco MD  3/14/2025 6:11 PM

## 2025-03-27 ENCOUNTER — LAB REQUISITION (OUTPATIENT)
Dept: LAB | Facility: CLINIC | Age: 35
End: 2025-03-27
Payer: COMMERCIAL

## 2025-03-27 DIAGNOSIS — Z34.80 ENCOUNTER FOR SUPERVISION OF OTHER NORMAL PREGNANCY, UNSPECIFIED TRIMESTER: ICD-10-CM

## 2025-03-27 PROCEDURE — 87653 STREP B DNA AMP PROBE: CPT | Mod: ORL | Performed by: OBSTETRICS & GYNECOLOGY

## 2025-03-28 LAB — GP B STREP DNA SPEC QL NAA+PROBE: NEGATIVE

## 2025-04-17 ENCOUNTER — HOSPITAL ENCOUNTER (INPATIENT)
Facility: HOSPITAL | Age: 35
End: 2025-04-17
Attending: OBSTETRICS & GYNECOLOGY | Admitting: OBSTETRICS & GYNECOLOGY
Payer: COMMERCIAL

## 2025-04-17 ENCOUNTER — ANESTHESIA EVENT (OUTPATIENT)
Dept: OBGYN | Facility: HOSPITAL | Age: 35
End: 2025-04-17
Payer: COMMERCIAL

## 2025-04-17 ENCOUNTER — ANESTHESIA (OUTPATIENT)
Dept: OBGYN | Facility: HOSPITAL | Age: 35
End: 2025-04-17
Payer: COMMERCIAL

## 2025-04-17 DIAGNOSIS — Z34.90 PREGNANCY, UNSPECIFIED GESTATIONAL AGE: Primary | ICD-10-CM

## 2025-04-17 LAB
ABO + RH BLD: NORMAL
ABO + RH BLD: NORMAL
ANTIBODY SCREEN, TUBE: NORMAL
ERYTHROCYTE [DISTWIDTH] IN BLOOD BY AUTOMATED COUNT: 13.3 % (ref 10–15)
FETAL CELL SCN BLD-IMP: NORMAL
HCT VFR BLD AUTO: 35.6 % (ref 35–47)
HGB BLD-MCNC: 12.8 G/DL (ref 11.7–15.7)
MCH RBC QN AUTO: 30.7 PG (ref 26.5–33)
MCHC RBC AUTO-ENTMCNC: 36 G/DL (ref 31.5–36.5)
MCV RBC AUTO: 85 FL (ref 78–100)
PLATELET # BLD AUTO: 237 10E3/UL (ref 150–450)
RBC # BLD AUTO: 4.17 10E6/UL (ref 3.8–5.2)
SPECIMEN EXP DATE BLD: NORMAL
T PALLIDUM AB SER QL: NONREACTIVE
WBC # BLD AUTO: 10.7 10E3/UL (ref 4–11)

## 2025-04-17 PROCEDURE — 3E0R3BZ INTRODUCTION OF ANESTHETIC AGENT INTO SPINAL CANAL, PERCUTANEOUS APPROACH: ICD-10-PCS | Performed by: ANESTHESIOLOGY

## 2025-04-17 PROCEDURE — 250N000013 HC RX MED GY IP 250 OP 250 PS 637: Performed by: OBSTETRICS & GYNECOLOGY

## 2025-04-17 PROCEDURE — 250N000011 HC RX IP 250 OP 636: Performed by: ANESTHESIOLOGY

## 2025-04-17 PROCEDURE — 85027 COMPLETE CBC AUTOMATED: CPT | Performed by: OBSTETRICS & GYNECOLOGY

## 2025-04-17 PROCEDURE — 10907ZC DRAINAGE OF AMNIOTIC FLUID, THERAPEUTIC FROM PRODUCTS OF CONCEPTION, VIA NATURAL OR ARTIFICIAL OPENING: ICD-10-PCS | Performed by: OBSTETRICS & GYNECOLOGY

## 2025-04-17 PROCEDURE — 86780 TREPONEMA PALLIDUM: CPT | Performed by: OBSTETRICS & GYNECOLOGY

## 2025-04-17 PROCEDURE — 36415 COLL VENOUS BLD VENIPUNCTURE: CPT | Performed by: OBSTETRICS & GYNECOLOGY

## 2025-04-17 PROCEDURE — 120N000001 HC R&B MED SURG/OB

## 2025-04-17 PROCEDURE — 722N000001 HC LABOR CARE VAGINAL DELIVERY SINGLE

## 2025-04-17 PROCEDURE — 250N000011 HC RX IP 250 OP 636: Mod: JZ | Performed by: OBSTETRICS & GYNECOLOGY

## 2025-04-17 PROCEDURE — 86850 RBC ANTIBODY SCREEN: CPT | Performed by: OBSTETRICS & GYNECOLOGY

## 2025-04-17 PROCEDURE — 250N000009 HC RX 250: Performed by: OBSTETRICS & GYNECOLOGY

## 2025-04-17 PROCEDURE — 0KQM0ZZ REPAIR PERINEUM MUSCLE, OPEN APPROACH: ICD-10-PCS | Performed by: OBSTETRICS & GYNECOLOGY

## 2025-04-17 PROCEDURE — 85461 HEMOGLOBIN FETAL: CPT | Performed by: OBSTETRICS & GYNECOLOGY

## 2025-04-17 PROCEDURE — 250N000011 HC RX IP 250 OP 636: Performed by: OBSTETRICS & GYNECOLOGY

## 2025-04-17 PROCEDURE — 00HU33Z INSERTION OF INFUSION DEVICE INTO SPINAL CANAL, PERCUTANEOUS APPROACH: ICD-10-PCS | Performed by: ANESTHESIOLOGY

## 2025-04-17 PROCEDURE — 258N000003 HC RX IP 258 OP 636: Performed by: OBSTETRICS & GYNECOLOGY

## 2025-04-17 PROCEDURE — 86901 BLOOD TYPING SEROLOGIC RH(D): CPT | Performed by: OBSTETRICS & GYNECOLOGY

## 2025-04-17 RX ORDER — LOPERAMIDE HYDROCHLORIDE 2 MG/1
2 CAPSULE ORAL
Status: DISCONTINUED | OUTPATIENT
Start: 2025-04-17 | End: 2025-04-19 | Stop reason: HOSPADM

## 2025-04-17 RX ORDER — NALOXONE HYDROCHLORIDE 0.4 MG/ML
0.2 INJECTION, SOLUTION INTRAMUSCULAR; INTRAVENOUS; SUBCUTANEOUS
Status: DISCONTINUED | OUTPATIENT
Start: 2025-04-17 | End: 2025-04-19 | Stop reason: HOSPADM

## 2025-04-17 RX ORDER — METHYLERGONOVINE MALEATE 0.2 MG/ML
200 INJECTION INTRAVENOUS
Status: DISCONTINUED | OUTPATIENT
Start: 2025-04-17 | End: 2025-04-17 | Stop reason: HOSPADM

## 2025-04-17 RX ORDER — LOPERAMIDE HYDROCHLORIDE 2 MG/1
4 CAPSULE ORAL
Status: DISCONTINUED | OUTPATIENT
Start: 2025-04-17 | End: 2025-04-19 | Stop reason: HOSPADM

## 2025-04-17 RX ORDER — OXYTOCIN/0.9 % SODIUM CHLORIDE 30/500 ML
1-24 PLASTIC BAG, INJECTION (ML) INTRAVENOUS CONTINUOUS
Status: DISCONTINUED | OUTPATIENT
Start: 2025-04-17 | End: 2025-04-17 | Stop reason: HOSPADM

## 2025-04-17 RX ORDER — METHYLERGONOVINE MALEATE 0.2 MG/ML
200 INJECTION INTRAVENOUS
Status: DISCONTINUED | OUTPATIENT
Start: 2025-04-17 | End: 2025-04-19 | Stop reason: HOSPADM

## 2025-04-17 RX ORDER — FENTANYL CITRATE 50 UG/ML
50 INJECTION, SOLUTION INTRAMUSCULAR; INTRAVENOUS EVERY 30 MIN PRN
Status: DISCONTINUED | OUTPATIENT
Start: 2025-04-17 | End: 2025-04-17 | Stop reason: HOSPADM

## 2025-04-17 RX ORDER — LIDOCAINE 40 MG/G
CREAM TOPICAL
Status: DISCONTINUED | OUTPATIENT
Start: 2025-04-17 | End: 2025-04-19 | Stop reason: HOSPADM

## 2025-04-17 RX ORDER — KETOROLAC TROMETHAMINE 15 MG/ML
15 INJECTION, SOLUTION INTRAMUSCULAR; INTRAVENOUS
Status: COMPLETED | OUTPATIENT
Start: 2025-04-17 | End: 2025-04-17

## 2025-04-17 RX ORDER — METOCLOPRAMIDE 10 MG/1
10 TABLET ORAL EVERY 6 HOURS PRN
Status: DISCONTINUED | OUTPATIENT
Start: 2025-04-17 | End: 2025-04-17 | Stop reason: HOSPADM

## 2025-04-17 RX ORDER — HYDROCORTISONE 25 MG/G
CREAM TOPICAL 3 TIMES DAILY PRN
Status: DISCONTINUED | OUTPATIENT
Start: 2025-04-17 | End: 2025-04-19 | Stop reason: HOSPADM

## 2025-04-17 RX ORDER — MISOPROSTOL 200 UG/1
800 TABLET ORAL
Status: DISCONTINUED | OUTPATIENT
Start: 2025-04-17 | End: 2025-04-17

## 2025-04-17 RX ORDER — ONDANSETRON 4 MG/1
4 TABLET, ORALLY DISINTEGRATING ORAL EVERY 6 HOURS PRN
Status: DISCONTINUED | OUTPATIENT
Start: 2025-04-17 | End: 2025-04-17 | Stop reason: HOSPADM

## 2025-04-17 RX ORDER — SODIUM CHLORIDE, SODIUM LACTATE, POTASSIUM CHLORIDE, CALCIUM CHLORIDE 600; 310; 30; 20 MG/100ML; MG/100ML; MG/100ML; MG/100ML
INJECTION, SOLUTION INTRAVENOUS CONTINUOUS PRN
Status: DISCONTINUED | OUTPATIENT
Start: 2025-04-17 | End: 2025-04-17 | Stop reason: HOSPADM

## 2025-04-17 RX ORDER — ONDANSETRON 2 MG/ML
4 INJECTION INTRAMUSCULAR; INTRAVENOUS EVERY 6 HOURS PRN
Status: DISCONTINUED | OUTPATIENT
Start: 2025-04-17 | End: 2025-04-17 | Stop reason: HOSPADM

## 2025-04-17 RX ORDER — OXYTOCIN 10 [USP'U]/ML
10 INJECTION, SOLUTION INTRAMUSCULAR; INTRAVENOUS
Status: DISCONTINUED | OUTPATIENT
Start: 2025-04-17 | End: 2025-04-17 | Stop reason: HOSPADM

## 2025-04-17 RX ORDER — OXYTOCIN 10 [USP'U]/ML
10 INJECTION, SOLUTION INTRAMUSCULAR; INTRAVENOUS
Status: DISCONTINUED | OUTPATIENT
Start: 2025-04-17 | End: 2025-04-19 | Stop reason: HOSPADM

## 2025-04-17 RX ORDER — ACETAMINOPHEN 325 MG/1
650 TABLET ORAL EVERY 4 HOURS PRN
Status: DISCONTINUED | OUTPATIENT
Start: 2025-04-17 | End: 2025-04-19 | Stop reason: HOSPADM

## 2025-04-17 RX ORDER — TRANEXAMIC ACID 10 MG/ML
1 INJECTION, SOLUTION INTRAVENOUS EVERY 30 MIN PRN
Status: DISCONTINUED | OUTPATIENT
Start: 2025-04-17 | End: 2025-04-17

## 2025-04-17 RX ORDER — MISOPROSTOL 200 UG/1
800 TABLET ORAL
Status: DISCONTINUED | OUTPATIENT
Start: 2025-04-17 | End: 2025-04-17 | Stop reason: HOSPADM

## 2025-04-17 RX ORDER — ACETAMINOPHEN 325 MG/1
650 TABLET ORAL EVERY 4 HOURS PRN
Status: DISCONTINUED | OUTPATIENT
Start: 2025-04-17 | End: 2025-04-17 | Stop reason: HOSPADM

## 2025-04-17 RX ORDER — MISOPROSTOL 200 UG/1
400 TABLET ORAL
Status: DISCONTINUED | OUTPATIENT
Start: 2025-04-17 | End: 2025-04-17

## 2025-04-17 RX ORDER — CARBOPROST TROMETHAMINE 250 UG/ML
250 INJECTION, SOLUTION INTRAMUSCULAR
Status: DISCONTINUED | OUTPATIENT
Start: 2025-04-17 | End: 2025-04-17

## 2025-04-17 RX ORDER — MISOPROSTOL 200 UG/1
400 TABLET ORAL
Status: DISCONTINUED | OUTPATIENT
Start: 2025-04-17 | End: 2025-04-19 | Stop reason: HOSPADM

## 2025-04-17 RX ORDER — LOPERAMIDE HYDROCHLORIDE 2 MG/1
2 CAPSULE ORAL
Status: DISCONTINUED | OUTPATIENT
Start: 2025-04-17 | End: 2025-04-17 | Stop reason: HOSPADM

## 2025-04-17 RX ORDER — IBUPROFEN 800 MG/1
800 TABLET, FILM COATED ORAL
Status: COMPLETED | OUTPATIENT
Start: 2025-04-17 | End: 2025-04-17

## 2025-04-17 RX ORDER — MISOPROSTOL 200 UG/1
800 TABLET ORAL
Status: DISCONTINUED | OUTPATIENT
Start: 2025-04-17 | End: 2025-04-19 | Stop reason: HOSPADM

## 2025-04-17 RX ORDER — NALOXONE HYDROCHLORIDE 0.4 MG/ML
0.4 INJECTION, SOLUTION INTRAMUSCULAR; INTRAVENOUS; SUBCUTANEOUS
Status: DISCONTINUED | OUTPATIENT
Start: 2025-04-17 | End: 2025-04-19 | Stop reason: HOSPADM

## 2025-04-17 RX ORDER — OXYTOCIN/0.9 % SODIUM CHLORIDE 30/500 ML
100-340 PLASTIC BAG, INJECTION (ML) INTRAVENOUS CONTINUOUS PRN
Status: DISCONTINUED | OUTPATIENT
Start: 2025-04-17 | End: 2025-04-19 | Stop reason: HOSPADM

## 2025-04-17 RX ORDER — BUPIVACAINE HYDROCHLORIDE 2.5 MG/ML
INJECTION, SOLUTION EPIDURAL; INFILTRATION; INTRACAUDAL; PERINEURAL
Status: COMPLETED | OUTPATIENT
Start: 2025-04-17 | End: 2025-04-17

## 2025-04-17 RX ORDER — KETOROLAC TROMETHAMINE 15 MG/ML
15 INJECTION, SOLUTION INTRAMUSCULAR; INTRAVENOUS
Status: DISCONTINUED | OUTPATIENT
Start: 2025-04-17 | End: 2025-04-17

## 2025-04-17 RX ORDER — METHYLERGONOVINE MALEATE 0.2 MG/ML
200 INJECTION INTRAVENOUS
Status: DISCONTINUED | OUTPATIENT
Start: 2025-04-17 | End: 2025-04-17

## 2025-04-17 RX ORDER — IBUPROFEN 800 MG/1
800 TABLET, FILM COATED ORAL
Status: DISCONTINUED | OUTPATIENT
Start: 2025-04-17 | End: 2025-04-17

## 2025-04-17 RX ORDER — PROCHLORPERAZINE MALEATE 10 MG
10 TABLET ORAL EVERY 6 HOURS PRN
Status: DISCONTINUED | OUTPATIENT
Start: 2025-04-17 | End: 2025-04-17 | Stop reason: HOSPADM

## 2025-04-17 RX ORDER — CARBOPROST TROMETHAMINE 250 UG/ML
250 INJECTION, SOLUTION INTRAMUSCULAR
Status: DISCONTINUED | OUTPATIENT
Start: 2025-04-17 | End: 2025-04-17 | Stop reason: HOSPADM

## 2025-04-17 RX ORDER — LIDOCAINE 40 MG/G
CREAM TOPICAL
Status: DISCONTINUED | OUTPATIENT
Start: 2025-04-17 | End: 2025-04-17 | Stop reason: HOSPADM

## 2025-04-17 RX ORDER — TRANEXAMIC ACID 10 MG/ML
1 INJECTION, SOLUTION INTRAVENOUS EVERY 30 MIN PRN
Status: DISCONTINUED | OUTPATIENT
Start: 2025-04-17 | End: 2025-04-17 | Stop reason: HOSPADM

## 2025-04-17 RX ORDER — CARBOPROST TROMETHAMINE 250 UG/ML
250 INJECTION, SOLUTION INTRAMUSCULAR
Status: DISCONTINUED | OUTPATIENT
Start: 2025-04-17 | End: 2025-04-19 | Stop reason: HOSPADM

## 2025-04-17 RX ORDER — LOPERAMIDE HYDROCHLORIDE 2 MG/1
4 CAPSULE ORAL
Status: DISCONTINUED | OUTPATIENT
Start: 2025-04-17 | End: 2025-04-17 | Stop reason: HOSPADM

## 2025-04-17 RX ORDER — OXYTOCIN/0.9 % SODIUM CHLORIDE 30/500 ML
340 PLASTIC BAG, INJECTION (ML) INTRAVENOUS CONTINUOUS PRN
Status: DISCONTINUED | OUTPATIENT
Start: 2025-04-17 | End: 2025-04-17 | Stop reason: HOSPADM

## 2025-04-17 RX ORDER — CITRIC ACID/SODIUM CITRATE 334-500MG
30 SOLUTION, ORAL ORAL
Status: DISCONTINUED | OUTPATIENT
Start: 2025-04-17 | End: 2025-04-17

## 2025-04-17 RX ORDER — MISOPROSTOL 200 UG/1
400 TABLET ORAL
Status: DISCONTINUED | OUTPATIENT
Start: 2025-04-17 | End: 2025-04-17 | Stop reason: HOSPADM

## 2025-04-17 RX ORDER — METOCLOPRAMIDE HYDROCHLORIDE 5 MG/ML
10 INJECTION INTRAMUSCULAR; INTRAVENOUS EVERY 6 HOURS PRN
Status: DISCONTINUED | OUTPATIENT
Start: 2025-04-17 | End: 2025-04-17 | Stop reason: HOSPADM

## 2025-04-17 RX ORDER — LOPERAMIDE HYDROCHLORIDE 2 MG/1
4 CAPSULE ORAL
Status: DISCONTINUED | OUTPATIENT
Start: 2025-04-17 | End: 2025-04-17

## 2025-04-17 RX ORDER — ACETAMINOPHEN 325 MG/1
650 TABLET ORAL EVERY 4 HOURS PRN
Status: DISCONTINUED | OUTPATIENT
Start: 2025-04-17 | End: 2025-04-17

## 2025-04-17 RX ORDER — FENTANYL CITRATE-0.9 % NACL/PF 10 MCG/ML
100 PLASTIC BAG, INJECTION (ML) INTRAVENOUS EVERY 5 MIN PRN
Status: DISCONTINUED | OUTPATIENT
Start: 2025-04-17 | End: 2025-04-17 | Stop reason: HOSPADM

## 2025-04-17 RX ORDER — OXYTOCIN/0.9 % SODIUM CHLORIDE 30/500 ML
340 PLASTIC BAG, INJECTION (ML) INTRAVENOUS CONTINUOUS PRN
Status: DISCONTINUED | OUTPATIENT
Start: 2025-04-17 | End: 2025-04-19 | Stop reason: HOSPADM

## 2025-04-17 RX ORDER — CITRIC ACID/SODIUM CITRATE 334-500MG
30 SOLUTION, ORAL ORAL
Status: DISCONTINUED | OUTPATIENT
Start: 2025-04-17 | End: 2025-04-17 | Stop reason: HOSPADM

## 2025-04-17 RX ORDER — IBUPROFEN 800 MG/1
800 TABLET, FILM COATED ORAL EVERY 6 HOURS PRN
Status: DISCONTINUED | OUTPATIENT
Start: 2025-04-17 | End: 2025-04-19 | Stop reason: HOSPADM

## 2025-04-17 RX ORDER — FENTANYL/ROPIVACAINE/NS/PF 2MCG/ML-.1
PLASTIC BAG, INJECTION (ML) EPIDURAL
Status: DISCONTINUED | OUTPATIENT
Start: 2025-04-17 | End: 2025-04-17 | Stop reason: HOSPADM

## 2025-04-17 RX ORDER — POLYETHYLENE GLYCOL 3350 17 G/17G
17 POWDER, FOR SOLUTION ORAL DAILY PRN
Status: DISCONTINUED | OUTPATIENT
Start: 2025-04-17 | End: 2025-04-19 | Stop reason: HOSPADM

## 2025-04-17 RX ORDER — TRANEXAMIC ACID 10 MG/ML
1 INJECTION, SOLUTION INTRAVENOUS EVERY 30 MIN PRN
Status: DISCONTINUED | OUTPATIENT
Start: 2025-04-17 | End: 2025-04-19 | Stop reason: HOSPADM

## 2025-04-17 RX ORDER — TERBUTALINE SULFATE 1 MG/ML
0.25 INJECTION SUBCUTANEOUS
Status: DISCONTINUED | OUTPATIENT
Start: 2025-04-17 | End: 2025-04-17 | Stop reason: HOSPADM

## 2025-04-17 RX ORDER — LOPERAMIDE HYDROCHLORIDE 2 MG/1
2 CAPSULE ORAL
Status: DISCONTINUED | OUTPATIENT
Start: 2025-04-17 | End: 2025-04-17

## 2025-04-17 RX ORDER — BISACODYL 10 MG
10 SUPPOSITORY, RECTAL RECTAL DAILY PRN
Status: DISCONTINUED | OUTPATIENT
Start: 2025-04-17 | End: 2025-04-19 | Stop reason: HOSPADM

## 2025-04-17 RX ORDER — NALBUPHINE HYDROCHLORIDE 20 MG/ML
2.5-5 INJECTION INTRAMUSCULAR; INTRAVENOUS; SUBCUTANEOUS EVERY 6 HOURS PRN
Status: DISCONTINUED | OUTPATIENT
Start: 2025-04-17 | End: 2025-04-19 | Stop reason: HOSPADM

## 2025-04-17 RX ORDER — SODIUM CHLORIDE, SODIUM LACTATE, POTASSIUM CHLORIDE, CALCIUM CHLORIDE 600; 310; 30; 20 MG/100ML; MG/100ML; MG/100ML; MG/100ML
INJECTION, SOLUTION INTRAVENOUS CONTINUOUS
Status: DISCONTINUED | OUTPATIENT
Start: 2025-04-17 | End: 2025-04-17 | Stop reason: HOSPADM

## 2025-04-17 RX ORDER — AMOXICILLIN 250 MG
2 CAPSULE ORAL
Status: DISCONTINUED | OUTPATIENT
Start: 2025-04-17 | End: 2025-04-19 | Stop reason: HOSPADM

## 2025-04-17 RX ORDER — TERBUTALINE SULFATE 1 MG/ML
0.25 INJECTION SUBCUTANEOUS
Status: DISCONTINUED | OUTPATIENT
Start: 2025-04-17 | End: 2025-04-17

## 2025-04-17 RX ADMIN — LIDOCAINE HYDROCHLORIDE 20 ML: 10 INJECTION, SOLUTION EPIDURAL; INFILTRATION; INTRACAUDAL; PERINEURAL at 18:56

## 2025-04-17 RX ADMIN — TRANEXAMIC ACID 1 G: 10 INJECTION, SOLUTION INTRAVENOUS at 18:32

## 2025-04-17 RX ADMIN — SODIUM CHLORIDE, SODIUM LACTATE, POTASSIUM CHLORIDE, AND CALCIUM CHLORIDE 250 ML: .6; .31; .03; .02 INJECTION, SOLUTION INTRAVENOUS at 15:34

## 2025-04-17 RX ADMIN — HUMAN RHO(D) IMMUNE GLOBULIN 300 MCG: 1500 SOLUTION INTRAMUSCULAR; INTRAVENOUS at 23:27

## 2025-04-17 RX ADMIN — BUPIVACAINE HYDROCHLORIDE 5 ML: 2.5 INJECTION, SOLUTION EPIDURAL; INFILTRATION; INTRACAUDAL at 16:50

## 2025-04-17 RX ADMIN — Medication 340 ML/HR: at 18:51

## 2025-04-17 RX ADMIN — Medication: at 17:07

## 2025-04-17 RX ADMIN — BENZOCAINE AND LEVOMENTHOL: 200; 5 SPRAY TOPICAL at 23:28

## 2025-04-17 RX ADMIN — KETOROLAC TROMETHAMINE 15 MG: 15 INJECTION, SOLUTION INTRAMUSCULAR; INTRAVENOUS at 19:22

## 2025-04-17 ASSESSMENT — ACTIVITIES OF DAILY LIVING (ADL)
ADLS_ACUITY_SCORE: 15
FALL_HISTORY_WITHIN_LAST_SIX_MONTHS: NO
ADLS_ACUITY_SCORE: 19
CHANGE_IN_FUNCTIONAL_STATUS_SINCE_ONSET_OF_CURRENT_ILLNESS/INJURY: NO
ADLS_ACUITY_SCORE: 15

## 2025-04-17 NOTE — H&P
[facility]    History and Physical       Date of Admission:  2025    History of Present Illness   Mary Martinez is a 34 year old female  39w0d  Estimated Date of Delivery: 2025 is calculated from Patient's last menstrual period was 2024 (exact date). is [facility]  for induction of labor.  Indication elective, LGA. Her pregnancy has been unremarkable other than LGA. EFW at 20 weeks 99% and EFW at 36 weeks 99%ile (8lb1). She declined PCS. Her 1 hour glucose was 142, she had a normal 3 hour glucose (all values). Her other babies were 8lbs 7oz and 8lbs 5oz. She denies a h/o shoulder dystocia, PPH or significant lacerations with her previous deliveries. She prefers IOL beginning with AROM. She was on celexa prior to her pregnancy and stopped it 1st trimester.     OB COMPLICATIONS:  LGA    Past Medical History    Past Medical History:   Diagnosis Date    Chickenpox     Chlamydia        Past Surgical History   Past Surgical History:   Procedure Laterality Date    DENTAL SURGERY      wisdom teeth removal    DILATION AND CURETTAGE SUCTION N/A 2024    Procedure: DILATION AND CURETTAGE, UTERUS, USING SUCTION;  Surgeon: Renetta Lezama MD;  Location: [facility] OR    NO HISTORY OF SURGERY         OB History    Para Term  AB Living   5 2 2 0 2 2   SAB IAB Ectopic Multiple Live Births   2 0 0 0 2      # Outcome Date GA Lbr Karri/2nd Weight Sex Type Anes PTL Lv   5 Current            4 SAB 23           3 Term 19    M    MAG      Name: Benoit   2 Term 16 41w2d 05:35 / 02:45 3.912 kg (8 lb 10 oz) M Vag-Spont EPI  MAG      Name: Miguel      Apgar1: 8  Apgar5: 9   1 SAB               Social History   Social History     Tobacco Use    Smoking status: Never     Passive exposure: Never    Smokeless tobacco: Never   Vaping Use    Vaping status: Never Used   Substance Use Topics    Alcohol use: Not Currently     Comment: not during  pregnancy    Drug use: No      Family History   Family History   Problem Relation Age of Onset    Lipids Father     Hypertension Maternal Grandmother     Heart Disease Maternal Grandmother     Hypertension Paternal Grandfather     Cerebrovascular Disease Paternal Grandfather     Connective Tissue Disorder Paternal Grandfather     Coronary Artery Disease Paternal Grandfather         MI    Hypertension Maternal Grandfather     Respiratory Maternal Grandfather         COPD    Coronary Artery Disease Maternal Grandfather         MI-stent    Hypertension Paternal Grandmother     Arthritis Paternal Grandmother     Eye Disorder Paternal Grandmother     Respiratory Paternal Grandmother         COPD    Depression Mother     Depression Maternal Aunt     Obesity Maternal Aunt     Genetic Disorder Brother         Menkes-  age 2    Breast Cancer No family hx of         Prior to Admission Medications   Prior to Admission Medications   Prescriptions Last Dose Informant Patient Reported? Taking?   Docosahexaenoic Acid (DHA) 200 MG capsule 2025  Yes Yes   Sig: Take 480 mg by mouth daily.   Prenatal Vit-Fe Fumarate-FA (PRENATAL MULTIVITAMIN  PLUS IRON) 27-1 MG TABS 2025  Yes Yes   Sig: Take by mouth daily   Probiotic Product (PROBIOTIC PO) Unknown  Yes No   citalopram (CELEXA) 10 MG tablet Unknown  Yes No   Sig: Take 0.5 mg by mouth daily.   vitamin D3 (CHOLECALCIFEROL) 2000 units tablet Unknown  Yes No      Facility-Administered Medications: None     Allergies   No Known Allergies    Physical Exam   Vital Signs with Ranges  Temp:  [98.7  F (37.1  C)] 98.7  F (37.1  C)  Resp:  [18] 18  BP: (117-120)/(72-77) 117/77    Gen: no acute distress, resting comfortably   CV: acyanotic   Heart: regular rate and rhythm   Pulm: unlabored respirations, clear to ausculation bilaterally    Abd: gravid, soft, nontender   Extremities: soft, nontender     Recent Labs   Lab Test 25  1614   AS Negative     Recent Labs   Lab Test  10/11/24  1600   HEPBANG Nonreactive   HIAGAB Nonreactive   RUQIGG Positive       Fetal Heart Tones: 140 baseline, moderate variablility, + accels, no decels, and Category I  TOCO:   frequency q 10 minutes  SVE: 3/90/-2, AROM clear    Bedside ultrasound confirms vertex      Assessment & Plan   Mary Martinez is a 34 year old female who presents for IOL for LGA  IUP @ 39w0d .  NST reactive.  Category  I    PLAN:   Admit - see IP orders  Labor induction with AROM, plan to begin pitocin if not in labor in 2 hours  Rhogam candidate     Venecia Sanchez MD

## 2025-04-17 NOTE — PROGRESS NOTES
Data: Patient presented to [facility]: 2025 12:16 PM.  Patient admitted for induction for macrosomnia. Patient is a .  Prenatal record reviewed. Pregnancy has been uncomplicated..  Gestational Age 39w0d. VSS. Fetal movement present. Patient denies leaking of vaginal fluid/rupture of membranes, vaginal bleeding, nausea, vomiting, headache, visual disturbances, significant edema. Support person is present.   Action: Verbal consent for EFM.  Admission assessment completed. Bill of rights reviewed.  Response: Patient verbalized agreement with plan. Will contact OB with update and further orders.

## 2025-04-17 NOTE — ANESTHESIA PROCEDURE NOTES
Epidural catheter Procedure Note    Pre-Procedure   Staff -        Anesthesiologist:  Marquis Wetzel MD       Performed By: anesthesiologist       Location: OB       Procedure Start/Stop Times: 4/17/2025 4:50 PM and 4/17/2025 5:12 PM       Pre-Anesthestic Checklist: patient identified, IV checked, risks and benefits discussed, informed consent, monitors and equipment checked, pre-op evaluation, at physician/surgeon's request and post-op pain management  Timeout:       Correct Patient: Yes        Correct Procedure: Yes        Correct Site: Yes        Correct Position: Yes   Procedure Documentation  Procedure: epidural catheter         Patient Position: sitting       Patient Prep/Sterile Barriers: sterile gloves, mask, patient draped       Skin prep: Betadine and Chloraprep       Local skin infiltrated with mL of 1% lidocaine.        Insertion Site: L3-4. (midline approach).       Technique: LORT saline and LORT air        Needle Type: Bosse Tools needle       Needle Gauge: 17.        Needle Length (Inches): 3.5        Catheter: 20 G.          Catheter threaded easily.             # of attempts: 1 and  # of redirects:  0    Assessment/Narrative         Paresthesias: No.       Test dose of 3 mL lidocaine 1.5% w/ 1:200,000 epinephrine at.         Test dose negative, 3 minutes after injection, for signs of intravascular, subdural, or intrathecal injection.       Insertion/Infusion Method: LORT saline and LORT air       Aspiration negative for Heme or CSF via Epidural Catheter.    Medication(s) Administered   0.25% Bupivacaine PF (Epidural) - EPIDURAL   5 mL - 4/17/2025 4:50:00 PM  Medication Administration Time: 4/17/2025 4:50 PM     Comments:  Epi   R/b/a discussed, patient agrees to have an epidural catheter placement.   Local infiltration of skin, advancement of needle without paresthesias, excellent Deidre to NS.   Catheter advanced without resistance nor paresthesias, negative aspiration for heme or CSF.  .  Patient tolerated  "the procedure well, all questions answered.        FOR [facility] ONLY:   Pain Team Contact information: please page the Pain Team Via Visual.[email or web address]. Search \"Pain\". During daytime hours, please page the attending first. At night please page the resident first.      "

## 2025-04-17 NOTE — ANESTHESIA PREPROCEDURE EVALUATION
Anesthesia Pre-Procedure Evaluation    Patient: Mary Martinez   MRN: [id number] : 1990        Procedure :           Past Medical History:   Diagnosis Date    Chickenpox     Chlamydia       Past Surgical History:   Procedure Laterality Date    DENTAL SURGERY      wisdom teeth removal    DILATION AND CURETTAGE SUCTION N/A 2024    Procedure: DILATION AND CURETTAGE, UTERUS, USING SUCTION;  Surgeon: Renetta Lezama MD;  Location: [facility] OR    NO HISTORY OF SURGERY        No Known Allergies   Social History     Tobacco Use    Smoking status: Never     Passive exposure: Never    Smokeless tobacco: Never   Substance Use Topics    Alcohol use: Not Currently     Comment: not during pregnancy      Wt Readings from Last 1 Encounters:   25 83.5 kg (184 lb)        Anesthesia Evaluation            ROS/MED HX  ENT/Pulmonary:  - neg pulmonary ROS     Neurologic:  - neg neurologic ROS     Cardiovascular:  - neg cardiovascular ROS     METS/Exercise Tolerance:     Hematologic:  - neg hematologic  ROS     Musculoskeletal:  - neg musculoskeletal ROS     GI/Hepatic:  - neg GI/hepatic ROS     Renal/Genitourinary:  - neg Renal ROS     Endo:  - neg endo ROS     Psychiatric/Substance Use:  - neg psychiatric ROS     Infectious Disease:  - neg infectious disease ROS     Malignancy:  - neg malignancy ROS     Other:  - neg other ROS          Physical Exam    Airway        Mallampati: II   TM distance: > 3 FB   Neck ROM: full   Mouth opening: > 3 cm    Respiratory Devices and Support         Dental           Cardiovascular   cardiovascular exam normal          Pulmonary   pulmonary exam normal                OUTSIDE LABS:  CBC:   Lab Results   Component Value Date    WBC 10.7 2025    WBC 11.9 (H) 2025    HGB 12.8 2025    HGB 12.0 2025    HCT 35.6 2025    HCT 37.0 2025     2025     2025     BMP:   Lab Results   Component Value Date     2024    NA  "135 11/05/2023    POTASSIUM 4.1 02/24/2024    POTASSIUM 4.1 11/05/2023    CHLORIDE 104 02/24/2024    CHLORIDE 100 11/05/2023    CO2 23 02/24/2024    CO2 24 11/05/2023    BUN 16.8 02/24/2024    BUN 13.0 11/05/2023    CR 0.87 02/24/2024    CR 0.74 11/05/2023    GLC 89 02/24/2024    GLC 96 11/05/2023     COAGS: No results found for: \"PTT\", \"INR\", \"FIBR\"  POC:   Lab Results   Component Value Date    HCGS Positive (A) 02/29/2024     HEPATIC:   Lab Results   Component Value Date    ALBUMIN 4.6 11/05/2023    PROTTOTAL 7.0 11/05/2023    ALT 37 11/05/2023    AST 30 11/05/2023    ALKPHOS 38 11/05/2023    BILITOTAL 0.2 11/05/2023     OTHER:   Lab Results   Component Value Date    A1C 5.1 10/11/2024    AEN 9.3 02/24/2024    TSH 0.83 06/17/2024    T4 0.99 07/11/2012    SED 3 03/18/2014       Anesthesia Plan    ASA Status:  2       Anesthesia Type: Epidural.              Consents    Anesthesia Plan(s) and associated risks, benefits, and realistic alternatives discussed. Questions answered and patient/representative(s) expressed understanding.     - Discussed: Risks, Benefits and Alternatives for the PROCEDURE were discussed     - Discussed with:  Patient      - Extended Intubation/Ventilatory Support Discussed: No.      - Patient is DNR/DNI Status: No          Postoperative Care            Comments:               Marquis Wetzel MD    Clinically Significant Risk Factors Present on Admission                                          "

## 2025-04-17 NOTE — PROGRESS NOTES
Dr. Sanchez at bedside. Ultrasound done. Cephalic presentation confirmed. SVE and AROM. Clear fluid.

## 2025-04-18 VITALS
TEMPERATURE: 98.3 F | HEIGHT: 69 IN | DIASTOLIC BLOOD PRESSURE: 66 MMHG | OXYGEN SATURATION: 96 % | BODY MASS INDEX: 27.25 KG/M2 | SYSTOLIC BLOOD PRESSURE: 101 MMHG | RESPIRATION RATE: 18 BRPM | WEIGHT: 184 LBS

## 2025-04-18 PROCEDURE — 250N000013 HC RX MED GY IP 250 OP 250 PS 637: Performed by: OBSTETRICS & GYNECOLOGY

## 2025-04-18 PROCEDURE — 120N000001 HC R&B MED SURG/OB

## 2025-04-18 RX ADMIN — ACETAMINOPHEN 650 MG: 325 TABLET, FILM COATED ORAL at 05:13

## 2025-04-18 RX ADMIN — IBUPROFEN 800 MG: 800 TABLET ORAL at 01:19

## 2025-04-18 RX ADMIN — IBUPROFEN 800 MG: 800 TABLET ORAL at 08:26

## 2025-04-18 RX ADMIN — IBUPROFEN 800 MG: 800 TABLET ORAL at 22:45

## 2025-04-18 RX ADMIN — PSYLLIUM HUSK 1 PACKET: 3.4 POWDER ORAL at 08:26

## 2025-04-18 RX ADMIN — IBUPROFEN 800 MG: 800 TABLET ORAL at 16:18

## 2025-04-18 RX ADMIN — ACETAMINOPHEN 650 MG: 325 TABLET, FILM COATED ORAL at 19:39

## 2025-04-18 RX ADMIN — ACETAMINOPHEN 650 MG: 325 TABLET, FILM COATED ORAL at 11:53

## 2025-04-18 ASSESSMENT — ACTIVITIES OF DAILY LIVING (ADL)
ADLS_ACUITY_SCORE: 20
ADLS_ACUITY_SCORE: 20
ADLS_ACUITY_SCORE: 22
ADLS_ACUITY_SCORE: 22
ADLS_ACUITY_SCORE: 20
ADLS_ACUITY_SCORE: 22
ADLS_ACUITY_SCORE: 20
ADLS_ACUITY_SCORE: 22
ADLS_ACUITY_SCORE: 22
ADLS_ACUITY_SCORE: 20
ADLS_ACUITY_SCORE: 22
ADLS_ACUITY_SCORE: 22
ADLS_ACUITY_SCORE: 20
ADLS_ACUITY_SCORE: 20
ADLS_ACUITY_SCORE: 22
ADLS_ACUITY_SCORE: 20
ADLS_ACUITY_SCORE: 22
ADLS_ACUITY_SCORE: 19
ADLS_ACUITY_SCORE: 20

## 2025-04-18 NOTE — ANESTHESIA POSTPROCEDURE EVALUATION
Patient: Mary Martinez    Procedure: * No procedures listed *       Anesthesia Type:  Epidural    Note:  Disposition: Inpatient   Postop Pain Control: Uneventful            Sign Out: Well controlled pain   PONV: No   Neuro/Psych: Uneventful            Sign Out: Acceptable/Baseline neuro status   Airway/Respiratory: Uneventful            Sign Out: Acceptable/Baseline resp. status   CV/Hemodynamics: Uneventful            Sign Out: Acceptable CV status; No obvious hypovolemia; No obvious fluid overload   Other NRE: NONE   DID A NON-ROUTINE EVENT OCCUR? No           Last vitals:  Vitals:    04/17/25 2027 04/17/25 2030 04/17/25 2035   BP: 103/61     Resp:      Temp:      SpO2:  97% 97%       Electronically Signed By: Kosta Ontiveros MD  April 17, 2025  8:39 PM

## 2025-04-18 NOTE — L&D DELIVERY NOTE
VAGINAL DELIVERY NOTE      Mary Martinez MRN# [id number]   Age: 34 year old YOB: 1990     PRE DELIVERY DIAGNOSIS  1) Intrauterine pregnancy at 39w0d   2) Suspected LGA fetus        POST DELIVERY DIAGNOSIS  1) Intrauterine pregnancy at 39w0d   2) Delivery of a living male.     1 Minute 5 Minute 10 Minute   Apgar Totals: 9   9          3) Weight:3.92 kg (8 lb 10.3 oz)  4) Postpartum hemorrhage: No  5) 2nd    Delivery Method/Type:   Vaginal, Spontaneous    PROVIDER:   Trudy Melgar MD     ANESTHESIA:  epidural;local    Labor Complications: None  Delivery QBL:   50   ROM to Delivery Time: (Delivered) Hours: 4 Minutes: 0    DESCRIPTION OF PROCEDURE  Mary Martinez is a 34 year old  with prenatal care with Dr. Patton who was admitted at 39w0d for elective induction. Fetus was suspected LGA.  She received an epidural. Mary Martinez delivered a viable infant with apgars of 9  and 9 . Delivery was via vaginal, spontaneous under epidural;local anesthesia. Infant delivered in vertex left occiput anterior position. Anterior and posterior shoulders delivered without difficulty. Delayed cord clamping was performed. The cord was clamped, cut twice and 3 vessels were noted.     Cord complications: none  Placenta delivered at 2025  6:59 PM. Placental disposition was Hospital disposal. Pitocin was administered after baby. Fundal massage performed and fundus found to be firm.     Episiotomy: none   Perineum, vagina, cervix were inspected, and the following lacerations were noted:   Perineal lacerations: 2nd    Any lacerations were repaired in the usual fashion using 3-0 Vicryl rapide.    Excellent hemostasis was noted. Needle count correct. Infant and patient in delivery room in stable condition.      Trudy Melgar MD          Pietro Martinez [[id number]]      Labor Event Times      Active labor onset date: 25 Onset time:  4:15 PM   Dilation complete date: 25 Complete  time:  6:30 PM   Start pushing date/time: 2025 1831          Labor Length      1st Stage (hrs): 2 (min): 15   2nd Stage (hrs): 0 (min): 20   3rd Stage (hrs): 0 (min): 8          Labor Events     labor?: No   steroids: None  Labor Type: Induction/Cervical ripening  Predominate monitoring during 1st stage: continuous electronic fetal monitoring     Rupture identifier: Sac 1  Rupture date/time: 25 1450   Rupture type: Artificial Rupture of Membranes  Fluid color: Clear     Induction: AROM  Induction date/time:      Cervical ripening date/time:      Indications for induction: Elective     Augmentation: None       Delivery/Placenta Date and Time      Delivery Date: 25 Delivery Time:  6:50 PM   Placenta Date/Time: 2025  6:59 PM  Oxytocin given at the time of delivery: with anterior shoulder  Delivering clinician: Trudy Melgar MD   Other personnel present at delivery:  Provider Role   Ariadne Garza RN Registered Nurse   Soraida Spann RN Registered Nurse             Vaginal Counts       Initial count performed by 2 team members:  Two Team Members   Soraida Spann, DESTINEE Melgar         Needles Suture Needles Sponges (RETIRED) Instruments   Initial counts 0 0 5    Added to count 1 1 0    Relief counts       Final counts 1 1 5            Placed during labor Accounted for at the end of labor   FSE No NA   IUPC No NA   Cervidil No NA                  Final count performed by 2 team members:  Two Team Members   Dr. Ramón Garza RNC      Final count correct?: Yes  Pre-Birth Team Brief: Complete  Post-Birth Team Debrief: Complete       Apgars    Living status: Living   1 Minute 5 Minute 10 Minute 15 Minute 20 Minute   Skin color: 1  1       Heart rate: 2  2       Reflex irritability: 2  2       Muscle tone: 2  2       Respiratory effort: 2  2       Total: 9  9       Apgars assigned by: RADHA MAY       Cord      Vessels: 3 Vessels    Cord Complications: None                Cord Blood Disposition: Lab    Gases Sent?: No    Delayed cord clamping?: Yes    Cord Clamping Delay (seconds): 31-60 seconds    Stem cell collection?: No           [place] Resuscitation    Methods: None       [place] Measurements      Weight: 8 lb 10.3 oz           Skin to Skin and Feeding Plan      Skin to skin initiation date/time: 1841    Skin to skin with: Mother  Skin to skin end date/time:     Breastfeeding initiated date/time: 2025 191       Labor Events and Shoulder Dystocia    Fetal Tracing Prior to Delivery: Category 1  Shoulder dystocia present?: Neg       Delivery (Maternal) (Provider to Complete) ([id number])    Episiotomy: None  Perineal lacerations: 2nd Repaired?: Yes   Repair suture: 3-0 Rapide  Genital tract inspection done: Pos       Blood Loss  Mother: Mary Martinez #[id number]     Start of Mother's Information      Delivery Blood Loss   Intrapartum & Postpartum: 25 1615 - 25    Delivery Admission: 25 1216 - 25         Intrapartum & Postpartum Delivery Admission    Delivery QBL (mL) Hospital Encounter 50 mL 50 mL    Total  50 mL 50 mL               End of Mother's Information  Mother: Mary Martinez #[id number]                Delivery - Provider to Complete ([id number])    Delivering clinician: Trudy Melgar MD  Delivery Type (Choose the 1 that will go to the Birth History): Vaginal, Spontaneous                         Other personnel:  Provider Role   Ariadne Garza RN Registered Nurse   Soraida Spann RN Registered Nurse                    Placenta    Date/Time: 2025  6:59 PM  Removal: Expressed  Disposition: Hospital disposal             Anesthesia    Method: Epidural, Local  Cervical dilation at placement: 4-7                    Presentation and Position    Presentation: Vertex    Position: Left Occiput Anterior

## 2025-04-18 NOTE — PLAN OF CARE
Goal Outcome Evaluation:      Plan of Care Reviewed With: patient, spouse    Overall Patient Progress: improvingOverall Patient Progress: improving     VSS, ambulating independently, breastfeeding well. Voided <200 x2, bladder scanned for 18 mL. Voided 250 mL since, needs one more measured void >200 mL. Cramping uterine pain reported at 1-2/10 and controlled with tylenol and ibuprofen per MAR. Rhogam given.

## 2025-04-18 NOTE — LACTATION NOTE
This note was copied from a baby's chart.  Initial Lactation Visit    Current age: 17 hours old    Gestational age at delivery: 39w0d     Diaper count (last 24 hours): 1 wet, 2 soiled Meconium     Feedings(last 24 hours): 6 breast      Breastfeeding goals:  12+months    Past breastfeeding experience: Yes (first two babies BF over a year)    Home Pump: Pt's pump is old and would like to get a Wearable after discharge.     Visit Summary:  Feeding was not observed. Discussed feeding plan below.    Breastfeeding Plan:     Offer breast every 2-3 hours or sooner if baby is showing hunger cues  Massage breast to encourage milk flow   Strive for a deep and comfortable latch (Should feel like a tugging at the nipple)   Positioning reminders:  Line up baby's nose to nipple   Baby's chin should be tilted into the breast tissue and baby's nose gentle touching the breast   Ear, shoulder, hip, nice straight line   Chin of baby should not be resting on the baby's chest.   Your thumb lined up like baby's mustache, fingers under breast like a baby's beard  Baby's cheeks should be touching breast equally on both sides   Switch sides when swallows slow, baby pauses lengthen and  breast compressions do not increase the swallows or activity at the breast     Overall goals for baby:    Feed well at breast 8 or more times per day, 15 minutes of active swallowing over 20-40 minutes at breast  Lose no more than 8-10% of birthweight in the first 3 days  Meet goals for wet and soiled diapers (per Postpartum & [place] Care booklet)  Weight back to birthweight in 10-14 days    If Above Goals Are Not Met     Pump 15-20 minutes after breastfeeding to stimulate and collect breast milk.  Offer back pumped milk after every feeding until infant is meeting goals above     Feed Expressed Milk to Baby Using Amounts Below as a Guideline:    0-24 hours is 2-10 ml per feeding   24-48 hours  is 5-15ml per feeding   48-72 hours is 15-30ml per feeding   72-96  hours is 30-60ml per feeding   96 hours and older follow healthcare providers recommendation    Give more as baby cues. If necessary, make up difference with donor milk or formula as a bridge until milk supply increases.     Education:   [x] [place] Feeding Patterns in the First Few Days/second Night  [] Maternal Risk Factors that Could Affect Milk Supply  [x] Hand Expression  [x] Stages of Milk Production  [x] Feeding Cues  [] LPI Feeding Behaviors  [] LBW Feeding Behaviors  [x] Rousing Techniques  [x] Benefits of Skin-to-Skin   [x] Breastfeeding Positions  [x] Tips to Maintain a Deep Latch     [x] Nutritive vs Non-Nutritive Sucking   [x] Gentle Breast Compressions  [x]  Weight Loss  [x] How to Know When Baby is Getting Enough to Eat  [] Supplementation & methods (including Paced Bottle Feeding)  [] Nipple Shield  [x] Sore Nipples  [] Pacifier Use  [] Tight Frenulum  [] Breastfeeding Twins  [] Pumping Plan/Flange fit and comfort  [] Breast pump Education  [] Engorgement/Reverse Pressure Softening  [x] Inpatient Lactation Support  [] Outpatient Lactation Resources/Follow up    Follow up: LC will continue to follow up during hospital stay.

## 2025-04-18 NOTE — PLAN OF CARE
Goal Outcome Evaluation:      Plan of Care Reviewed With: patient    Overall Patient Progress: improvingOverall Patient Progress: improving           Problem: Adult Inpatient Plan of Care  Goal: Plan of Care Review  Description: The Plan of Care Review/Shift note should be completed every shift.  The Outcome Evaluation is a brief statement about your assessment that the patient is improving, declining, or no change.  This information will be displayed automatically on your shiftnote.  Outcome: Progressing  Flowsheets (Taken 4/18/2025 6337)  Plan of Care Reviewed With: patient  Overall Patient Progress: improving   VSS  Fundus firm, -1U  Lochia light  2nd degree laceration, using tucks, raudel bottle  Voiding  Minimal discomfort, managed with prn tylenol and ibuprofen

## 2025-04-18 NOTE — PROGRESS NOTES
Late entry d/t patient care:    From 1626 to 1654, FHR intermittently tracing maternal HR. Patient sitting up on edge of bed, reporting pain with contractions. RN at bedside continuously, adjusting US frequently. Difficult to trace FHR d/t patient position. Patient weighing whether or not to get epidural, RN at bedside answering patient's questions during this time.

## 2025-04-18 NOTE — PROGRESS NOTES
"    Postpartum Day 1: Vaginal Delivery    Subjective:  The patient feels well. The patient has no emotional concerns. Pain is well controlled with current medications. The patient is ambulating well. She is voiding and passing gas.The amount and color of the lochia is appropriate for the duration of recovery, patient denies clots. The baby is well and in the room.    Objective   The patient has a blood pressure which is within the normal range. The uterine fundus is firm. Urinary output is adequate.     Exam:   /64 (BP Location: Right arm, Patient Position: Semi-Sahni's, Cuff Size: Adult Regular)   Pulse 87   Temp 98.1  F (36.7  C) (Oral)   Resp 16   Ht 1.753 m (5' 9\")   Wt 83.5 kg (184 lb)   LMP 05/22/2024 (Exact Date)   SpO2 97%   Breastfeeding Unknown   BMI 27.17 kg/m    General: NAD  Abdomen: soft, NT   Fundus: firm, NT  Ext: no pain     Impression:   Normal postpartum course.     Plan:   - continue current care.  - Likely home tomorrow.      Yashira Vilchis, DO  [facility]  "

## 2025-04-19 VITALS
SYSTOLIC BLOOD PRESSURE: 114 MMHG | DIASTOLIC BLOOD PRESSURE: 67 MMHG | HEART RATE: 74 BPM | HEIGHT: 69 IN | OXYGEN SATURATION: 97 % | TEMPERATURE: 97.7 F | BODY MASS INDEX: 26.47 KG/M2 | RESPIRATION RATE: 16 BRPM | WEIGHT: 178.7 LBS

## 2025-04-19 PROCEDURE — 250N000013 HC RX MED GY IP 250 OP 250 PS 637: Performed by: OBSTETRICS & GYNECOLOGY

## 2025-04-19 RX ORDER — ACETAMINOPHEN 325 MG/1
650 TABLET ORAL EVERY 4 HOURS PRN
COMMUNITY
Start: 2025-04-19

## 2025-04-19 RX ORDER — AMOXICILLIN 250 MG
2 CAPSULE ORAL
COMMUNITY
Start: 2025-04-19

## 2025-04-19 RX ORDER — IBUPROFEN 800 MG/1
800 TABLET, FILM COATED ORAL EVERY 6 HOURS PRN
COMMUNITY
Start: 2025-04-19

## 2025-04-19 RX ADMIN — ACETAMINOPHEN 650 MG: 325 TABLET, FILM COATED ORAL at 13:37

## 2025-04-19 RX ADMIN — ACETAMINOPHEN 650 MG: 325 TABLET, FILM COATED ORAL at 08:39

## 2025-04-19 RX ADMIN — IBUPROFEN 800 MG: 800 TABLET ORAL at 10:59

## 2025-04-19 RX ADMIN — ACETAMINOPHEN 650 MG: 325 TABLET, FILM COATED ORAL at 04:35

## 2025-04-19 RX ADMIN — PSYLLIUM HUSK 1 PACKET: 3.4 POWDER ORAL at 08:40

## 2025-04-19 ASSESSMENT — ACTIVITIES OF DAILY LIVING (ADL)
ADLS_ACUITY_SCORE: 22

## 2025-04-19 NOTE — PROVIDER NOTIFICATION
"Notified MD at 1107 AM regarding change in condition.      Comments: Patient states that she feels as though her heartbeat feels \"heavier.\" Feels as though this happened with her last pregnancy, but isn't sure. Heart rate in 70's, no murmurs noted with auscultation.     Spoke with: Dr. Franco, check for chest pain, otherwise could be normal with more fluid from delivery.     Orders were obtained.    Per patient, no signs or symptoms of chest pain or heaviness.     "

## 2025-04-19 NOTE — LACTATION NOTE
This note was copied from a baby's chart.  Follow up Lactation Visit    Current age:  38 hours old    Gestational age at delivery:  39w0d     Diaper count (last 24 hours):  2 wet 4 soiled Green     Feedings (last 24 hours):  11 breast     Weight Loss:  6% at 34 hours    Breastfeeding goals:  12+months    Past breastfeeding experience:  her first two children exclusively until solid foods were offered    Maternal health risk that may affect breastfeeding:  None    Home Pump:  Lactation distributed mother a Spectra breast pump from [facility].  Mother was instructed on the use and cleaning of the breast pump.  Mother verbalizes understanding of how to use the breast pump.  She was instructed to empty her breasts 8-12 times in 24 hours, either with the baby at the breast or the breast pump, to have optimal milk production for her .       Breast assessment:  Breast: Round, Symmetrical, and Soft , Nipple:Everted and Intact    Feeding assessment:  Mother had infant in football hold coming from up and under the breast, stating that latch was comfortable. She is able to recognize swallows at the breast. Infant came off breast on his own and placed skin to skin with Mother.     Visit Summary:  Discussed different breastpump options and engorgement.      Breastfeeding Plan:     Offer breast every 2-3 hours or sooner if baby is showing hunger cues  Massage breast to encourage milk flow   Strive for a deep and comfortable latch (Should feel like a tugging at the nipple)   Positioning reminders:  Line up baby's nose to nipple   Baby's chin should be tilted into the breast tissue and baby's nose gentle touching the breast   Ear, shoulder, hip, nice straight line   Chin of baby should not be resting on the baby's chest.   Your thumb lined up like baby's mustache, fingers under breast like a baby's beard  Baby's cheeks should be touching breast equally on both sides   Switch sides when swallows  slow, baby pauses lengthen and  breast compressions do not increase the swallows or activity at the breast      Education:   [x] [place] Feeding Patterns in the First Few Days/second Night   [] Maternal Risk Factors that Could Affect Milk Supply  [x] Hand Expression  [x] Stages of Milk Production  [] Feeding Cues  [] LPI Feeding Behaviors  [] LBW Feeding Behaviors  [x] Rousing Techniques  [] Benefits of Skin to Skin  [] Breastfeeding Positions  [] Tips to Maintain a Deep Latch  [] Nutritive vs Non-Nutritive Sucking   [x] Gentle Breast Compressions  []  Weight Loss  [] How to Know When Baby is Getting Enough to Eat  [] Supplementation Methods  [] Type of Supplement  [] Nipple Shield  [x] Sore Nipples  [] Pacifier Use  [] Tight Frenulum  [] Breastfeeding Twins  [] Pumping Plan  [x] Breastpump Education  [x] Engorgement/Reverse Pressure Softening  [x] Inpatient Lactation Support  [x] Outpatient Lactation Resources/Follow up    Follow up: LC will continue to follow up during hospital stay.

## 2025-04-19 NOTE — PLAN OF CARE
Goal Outcome Evaluation:      Plan of Care Reviewed With: patient, spouse    Overall Patient Progress: improvingOverall Patient Progress: improving     VSS, breastfeeding well, minimal pain controlled with tylenol and ibuprofen per MAR. Fundus firm -3 U. Discharged to home with baby- bands matched upon discharge.

## 2025-04-19 NOTE — DISCHARGE SUMMARY
Discharge Summary    Mary Martinez MRN# [id number]   Age: 34 year old YOB: 1990     Date of Admission:  4/17/2025  Date of Discharge::  4/19/2025  Admitting Physician:  Venecia Sanchez MD  Discharge Physician:  Allyson Franco MD     Home clinic: [facility]          Admission Diagnoses:   Pregnancy [Z34.90]  Intrauterine pregnancy at 39w0d          Discharge Diagnosis:   Same   S/p vaginal delivery          Procedures:     Procedure(s): Vaginal delivery               Medications Prior to Admission:     Medications Prior to Admission   Medication Sig Dispense Refill Last Dose/Taking    Docosahexaenoic Acid (DHA) 200 MG capsule Take 480 mg by mouth daily.   4/17/2025    Prenatal Vit-Fe Fumarate-FA (PRENATAL MULTIVITAMIN  PLUS IRON) 27-1 MG TABS Take by mouth daily   4/17/2025    citalopram (CELEXA) 10 MG tablet Take 0.5 mg by mouth daily.   Unknown    Probiotic Product (PROBIOTIC PO)    Unknown    vitamin D3 (CHOLECALCIFEROL) 2000 units tablet    Unknown             Discharge Medications:        Review of your medicines        START taking        Dose / Directions   acetaminophen 325 MG tablet  Commonly known as: TYLENOL  Used for: Spontaneous vaginal delivery      Dose: 650 mg  Take 2 tablets (650 mg) by mouth every 4 hours as needed for fever or other (second line or per patient preference for mild to moderate pain management).  Refills: 0     ibuprofen 800 MG tablet  Commonly known as: ADVIL/MOTRIN  Used for: Spontaneous vaginal delivery      Dose: 800 mg  Take 1 tablet (800 mg) by mouth every 6 hours as needed for other (first line or per patient preference for mild to moderate pain management).  Refills: 0     senna-docusate 8.6-50 MG tablet  Commonly known as: SENOKOT-S/PERICOLACE  Used for: Spontaneous vaginal delivery      Dose: 2 tablet  Take 2 tablets by mouth nightly as needed for constipation.  Refills: 0            CONTINUE these medicines which have NOT CHANGED         Dose / Directions   citalopram 10 MG tablet  Commonly known as: celeXA  Indication: Generalized Anxiety Disorder      Dose: 0.5 mg  Take 0.5 mg by mouth daily.  Refills: 0     Docosahexaenoic Acid 200 MG capsule  Commonly known as: DHA      Dose: 480 mg  Take 480 mg by mouth daily.  Refills: 0     prenatal multivitamin  plus iron 27-1 MG Tabs      Take by mouth daily  Refills: 0     PROBIOTIC PO      Refills: 0     vitamin D3 50 mcg (2000 units) tablet  Commonly known as: CHOLECALCIFEROL      Refills: 0               Where to get your medicines        Some of these will need a paper prescription and others can be bought over the counter. Ask your nurse if you have questions.    You don't need a prescription for these medications  acetaminophen 325 MG tablet  ibuprofen 800 MG tablet  senna-docusate 8.6-50 MG tablet               Consultations:   No consultations were requested during this admission          Brief History of Labor:   Mary Martinez is a 34 year old female  39w0d  Estimated Date of Delivery: 2025 is calculated from Patient's last menstrual period was 2024 (exact date). [facility]  for induction of labor.  Indication elective, LGA. Her pregnancy has been unremarkable other than LGA. EFW at 20 weeks 99% and EFW at 36 weeks 99%ile (8lb1). She declined PCS. Her 1 hour glucose was 142, she had a normal 3 hour glucose (all values). Her other babies were 8lbs 7oz and 8lbs 5oz. She denies a h/o shoulder dystocia, PPH or significant lacerations with her previous deliveries. She prefers IOL beginning with AROM. She was on celexa prior to her pregnancy and stopped it 1st trimester.            Hospital Course:   The patient's hospital course was unremarkable.  On discharge, her pain was well controlled. Vaginal bleeding is similar to peak menstrual flow.  Voiding without difficulty.  Ambulating well and tolerating a normal diet.  Infant is stable. She was discharged on  "post-partum day #2.    Post-partum hemoglobin:   Hemoglobin   Date Value Ref Range Status   04/17/2025 12.8 11.7 - 15.7 g/dL Final   02/10/2016 13.1 11.7 - 15.7 g/dL Final         Day of discharge assessment:   /67 (BP Location: Right arm)   Pulse 74   Temp 97.7  F (36.5  C) (Oral)   Resp 16   Ht 1.753 m (5' 9\")   Wt 81.2 kg (179 lb 1.6 oz)   LMP 05/22/2024 (Exact Date)   SpO2 97%   Breastfeeding Unknown   BMI 26.45 kg/m    Abdomen: Soft, fundus firm .        Discharge Instructions and Follow-Up:     Discharge diet: Regular   Discharge activity: Activity as tolerated, pelvic rest for 6 weeks   Discharge follow-up:   Follow up with with your OB/GYN in 6 weeks    Wound care: Sitz baths           Discharge Disposition:     Discharged to home          Allyson Franco MD      "

## 2025-04-19 NOTE — PLAN OF CARE
Problem: Breastfeeding  Goal: Effective Breastfeeding  Outcome: Progressing     Problem: Postpartum (Vaginal Delivery)  Goal: Optimal Pain Control and Function  Outcome: Progressing  Intervention: Prevent or Manage Pain  Recent Flowsheet Documentation  Taken 4/18/2025 1939 by Angélica Estrada, RN  Pain Management Interventions: medication (see MAR)   Goal Outcome Evaluation:        Patient is very attentive to infant. Up independently in room, emptying bladder and tolerating regular diet. Breastfeeding infant who is cluster feeding tonight, prefers to be skin to skin. Looking forward to going home later today to be with other children.

## 2025-04-19 NOTE — DISCHARGE INSTRUCTIONS
Warning Signs after Having a Baby    Keep this paper on your fridge or somewhere else where you can see it.    Call your provider if you have any of these symptoms up to 12 weeks after having your baby.    Thoughts of hurting yourself or your baby  Pain in your chest or trouble breathing  Severe headache not helped by pain medicine  Eyesight concerns (blurry vision, seeing spots or flashes of light, other changes to eyesight)  Fainting, shaking or other signs of a seizure    Call 9-1-1 if you feel that it is an emergency.     The symptoms below can happen to anyone after giving birth. They can be very serious. Call your provider if you have any of these warning signs.    My provider s phone number: _______________________    Losing too much blood (hemorrhage)    Call your provider if you soak through a pad in less than an hour or pass blood clots bigger than a golf ball. These may be signs that you are bleeding too much.    Blood clots in the legs or lungs    After you give birth, your body naturally clots its blood to help prevent blood loss. Sometimes this increased clotting can happen in other areas of the body, like the legs or lungs. This can block your blood flow and be very dangerous.     Call your provider if you:  Have a red, swollen spot on the back of your leg that is warm or painful when you touch it.   Are coughing up blood.     Infection    Call your provider if you have any of these symptoms:  Fever of 100.4 F (38 C) or higher.  Pain or redness around your stitches if you had an incision.   Any yellow, white, or green fluid coming from places where you had stitches or surgery.    Mood Problems (postpartum depression)    Many people feel sad or have mood changes after having a baby. But for some people, these mood swings are worse.     Call your provider right away if you feel so anxious or nervous that you can't care for yourself or your baby.    Preeclampsia (high blood pressure)    Even if you  didn't have high blood pressure when you were pregnant, you are at risk for the high blood pressure disease called preeclampsia. This risk can last up to 12 weeks after giving birth.     Call your provider if you have:   Pain on your right side under your rib cage  Sudden swelling in the hands and face    Remember: You know your body. If something doesn't feel right, get medical help.     For informational purposes only. Not to replace the advice of your health care provider. Copyright 2020 [facility]. All rights reserved. Clinically reviewed by Kiki Rajput, RNC-OB, MSN. [employer] [id number] - Rev 02/23.

## 2025-05-08 ENCOUNTER — MEDICAL CORRESPONDENCE (OUTPATIENT)
Dept: HEALTH INFORMATION MANAGEMENT | Facility: CLINIC | Age: 35
End: 2025-05-08
Payer: COMMERCIAL

## 2025-05-15 ENCOUNTER — LAB REQUISITION (OUTPATIENT)
Dept: LAB | Facility: CLINIC | Age: 35
End: 2025-05-15
Payer: COMMERCIAL

## 2025-05-15 DIAGNOSIS — R33.9 RETENTION OF URINE, UNSPECIFIED: ICD-10-CM

## 2025-05-15 PROCEDURE — 87086 URINE CULTURE/COLONY COUNT: CPT | Mod: ORL | Performed by: OBSTETRICS & GYNECOLOGY

## 2025-05-17 LAB — BACTERIA UR CULT: NORMAL

## 2025-05-31 ENCOUNTER — HEALTH MAINTENANCE LETTER (OUTPATIENT)
Age: 35
End: 2025-05-31

## 2025-06-24 ENCOUNTER — MEDICAL CORRESPONDENCE (OUTPATIENT)
Dept: HEALTH INFORMATION MANAGEMENT | Facility: CLINIC | Age: 35
End: 2025-06-24
Payer: COMMERCIAL

## 2025-09-02 ENCOUNTER — MEDICAL CORRESPONDENCE (OUTPATIENT)
Dept: HEALTH INFORMATION MANAGEMENT | Facility: CLINIC | Age: 35
End: 2025-09-02
Payer: COMMERCIAL

## (undated) DEVICE — SUCTION CANNULA UTERINE 07MM CVD  21853

## (undated) DEVICE — GOWN LG DISP 9515

## (undated) DEVICE — LUBRICATING JELLY 4.25OZ

## (undated) DEVICE — SUCTION VACUUM CANISTER STANDARD W/LID&CAPS 003987-901

## (undated) DEVICE — SUCTION CANNULA UTERINE 07MM CVD 022107-10

## (undated) DEVICE — SOL NACL 0.9% IRRIG 1000ML BOTTLE 07138-09

## (undated) DEVICE — GLOVE BIOGEL PI MICRO SZ 6.5 48565

## (undated) DEVICE — NDL SPINAL 22GA 3.5" QUINCKE 405181

## (undated) DEVICE — STOCKING SLEEVE COMPRESSION CALF LG

## (undated) DEVICE — CATH INTERMITTENT CLEAN-CATH FEMALE 14FR 6" VINYL LF 420614

## (undated) DEVICE — PAD PERI INDIV WRAP 11" 2022

## (undated) DEVICE — GLOVE BIOGEL PI MICRO INDICATOR UNDERGLOVE SZ 7.0 48970

## (undated) DEVICE — DECANTER VIAL 2006S

## (undated) DEVICE — TUBING VACUUM COLLECTION 6FT 23116

## (undated) DEVICE — PREP CHLORHEXIDINE 4% 4OZ (HIBICLENS) 57504

## (undated) DEVICE — DRSG TELFA 3X8" 1238

## (undated) DEVICE — PACK LAPAROSCOPY/PELVISCOPY STD

## (undated) RX ORDER — LIDOCAINE HYDROCHLORIDE 10 MG/ML
INJECTION, SOLUTION INFILTRATION; PERINEURAL
Status: DISPENSED
Start: 2024-02-29

## (undated) RX ORDER — ONDANSETRON 2 MG/ML
INJECTION INTRAMUSCULAR; INTRAVENOUS
Status: DISPENSED
Start: 2024-02-29

## (undated) RX ORDER — PROPOFOL 10 MG/ML
INJECTION, EMULSION INTRAVENOUS
Status: DISPENSED
Start: 2024-02-29

## (undated) RX ORDER — GABAPENTIN 300 MG/1
CAPSULE ORAL
Status: DISPENSED
Start: 2024-02-29

## (undated) RX ORDER — ACETAMINOPHEN 325 MG/1
TABLET ORAL
Status: DISPENSED
Start: 2024-02-29

## (undated) RX ORDER — DEXAMETHASONE SODIUM PHOSPHATE 4 MG/ML
INJECTION, SOLUTION INTRA-ARTICULAR; INTRALESIONAL; INTRAMUSCULAR; INTRAVENOUS; SOFT TISSUE
Status: DISPENSED
Start: 2024-02-29